# Patient Record
Sex: MALE | Race: WHITE | NOT HISPANIC OR LATINO | Employment: FULL TIME | URBAN - METROPOLITAN AREA
[De-identification: names, ages, dates, MRNs, and addresses within clinical notes are randomized per-mention and may not be internally consistent; named-entity substitution may affect disease eponyms.]

---

## 2017-02-01 ENCOUNTER — APPOINTMENT (OUTPATIENT)
Dept: GENERAL RADIOLOGY | Facility: CLINIC | Age: 57
End: 2017-02-01
Attending: FAMILY MEDICINE
Payer: COMMERCIAL

## 2017-02-01 ENCOUNTER — HOSPITAL ENCOUNTER (INPATIENT)
Facility: CLINIC | Age: 57
LOS: 2 days | Discharge: HOME | DRG: 201 | End: 2017-02-03
Attending: INTERNAL MEDICINE | Admitting: INTERNAL MEDICINE
Payer: COMMERCIAL

## 2017-02-01 ENCOUNTER — HOSPITAL ENCOUNTER (EMERGENCY)
Facility: CLINIC | Age: 57
Discharge: STILL ADMITTED OR ADMINISTRATIVE | End: 2017-02-01
Attending: FAMILY MEDICINE | Admitting: FAMILY MEDICINE
Payer: COMMERCIAL

## 2017-02-01 VITALS
TEMPERATURE: 97.3 F | DIASTOLIC BLOOD PRESSURE: 78 MMHG | OXYGEN SATURATION: 93 % | WEIGHT: 165.6 LBS | RESPIRATION RATE: 24 BRPM | SYSTOLIC BLOOD PRESSURE: 135 MMHG

## 2017-02-01 DIAGNOSIS — J93.11 PRIMARY SPONTANEOUS PNEUMOTHORAX: Primary | ICD-10-CM

## 2017-02-01 DIAGNOSIS — J93.83 SPONTANEOUS PNEUMOTHORAX: ICD-10-CM

## 2017-02-01 DIAGNOSIS — J43.8 OTHER EMPHYSEMA (H): ICD-10-CM

## 2017-02-01 PROBLEM — J93.9 PNEUMOTHORAX: Status: ACTIVE | Noted: 2017-02-01

## 2017-02-01 PROBLEM — J42 CHRONIC BRONCHITIS (H): Status: ACTIVE | Noted: 2017-02-01

## 2017-02-01 LAB
ALBUMIN SERPL-MCNC: 3.7 G/DL (ref 3.4–5)
ALP SERPL-CCNC: 56 U/L (ref 40–150)
ALT SERPL W P-5'-P-CCNC: 22 U/L (ref 0–70)
ANION GAP SERPL CALCULATED.3IONS-SCNC: 7 MMOL/L (ref 3–14)
APTT PPP: 25 SEC (ref 22–37)
AST SERPL W P-5'-P-CCNC: 19 U/L (ref 0–45)
BASOPHILS # BLD AUTO: 0 10E9/L (ref 0–0.2)
BASOPHILS NFR BLD AUTO: 0.3 %
BILIRUB SERPL-MCNC: 1.1 MG/DL (ref 0.2–1.3)
BUN SERPL-MCNC: 21 MG/DL (ref 7–30)
CALCIUM SERPL-MCNC: 9 MG/DL (ref 8.5–10.1)
CHLORIDE SERPL-SCNC: 112 MMOL/L (ref 94–109)
CO2 SERPL-SCNC: 26 MMOL/L (ref 20–32)
CREAT SERPL-MCNC: 0.92 MG/DL (ref 0.66–1.25)
D DIMER PPP FEU-MCNC: 0.5 UG/ML FEU (ref 0–0.5)
DIFFERENTIAL METHOD BLD: ABNORMAL
EOSINOPHIL # BLD AUTO: 0 10E9/L (ref 0–0.7)
EOSINOPHIL NFR BLD AUTO: 0.3 %
ERYTHROCYTE [DISTWIDTH] IN BLOOD BY AUTOMATED COUNT: 13.4 % (ref 10–15)
GFR SERPL CREATININE-BSD FRML MDRD: 85 ML/MIN/1.7M2
GLUCOSE SERPL-MCNC: 133 MG/DL (ref 70–99)
HCT VFR BLD AUTO: 45.9 % (ref 40–53)
HGB BLD-MCNC: 15.2 G/DL (ref 13.3–17.7)
IMM GRANULOCYTES # BLD: 0.1 10E9/L (ref 0–0.4)
IMM GRANULOCYTES NFR BLD: 0.4 %
INR PPP: 0.92 (ref 0.86–1.14)
LYMPHOCYTES # BLD AUTO: 1.1 10E9/L (ref 0.8–5.3)
LYMPHOCYTES NFR BLD AUTO: 9.4 %
MCH RBC QN AUTO: 29.1 PG (ref 26.5–33)
MCHC RBC AUTO-ENTMCNC: 33.1 G/DL (ref 31.5–36.5)
MCV RBC AUTO: 88 FL (ref 78–100)
MONOCYTES # BLD AUTO: 0.8 10E9/L (ref 0–1.3)
MONOCYTES NFR BLD AUTO: 6.8 %
NEUTROPHILS # BLD AUTO: 9.4 10E9/L (ref 1.6–8.3)
NEUTROPHILS NFR BLD AUTO: 82.8 %
NRBC # BLD AUTO: 0 10*3/UL
NRBC BLD AUTO-RTO: 0 /100
PLATELET # BLD AUTO: 159 10E9/L (ref 150–450)
POTASSIUM SERPL-SCNC: 3.8 MMOL/L (ref 3.4–5.3)
PROT SERPL-MCNC: 7.3 G/DL (ref 6.8–8.8)
RADIOLOGIST FLAGS: ABNORMAL
RBC # BLD AUTO: 5.22 10E12/L (ref 4.4–5.9)
SODIUM SERPL-SCNC: 145 MMOL/L (ref 133–144)
TROPONIN I BLD-MCNC: 0.01 UG/L (ref 0–0.1)
TROPONIN I SERPL-MCNC: NORMAL UG/L (ref 0–0.04)
WBC # BLD AUTO: 11.4 10E9/L (ref 4–11)

## 2017-02-01 PROCEDURE — 40000940 XR CHEST PORT 1 VW

## 2017-02-01 PROCEDURE — 25000132 ZZH RX MED GY IP 250 OP 250 PS 637: Performed by: PHYSICIAN ASSISTANT

## 2017-02-01 PROCEDURE — 71010 XR CHEST PORT 1 VW: CPT

## 2017-02-01 PROCEDURE — 84484 ASSAY OF TROPONIN QUANT: CPT

## 2017-02-01 PROCEDURE — 25000128 H RX IP 250 OP 636: Performed by: FAMILY MEDICINE

## 2017-02-01 PROCEDURE — 93005 ELECTROCARDIOGRAM TRACING: CPT | Performed by: FAMILY MEDICINE

## 2017-02-01 PROCEDURE — 12000007 ZZH R&B INTERMEDIATE

## 2017-02-01 PROCEDURE — 99285 EMERGENCY DEPT VISIT HI MDM: CPT | Mod: Z6 | Performed by: FAMILY MEDICINE

## 2017-02-01 PROCEDURE — 99285 EMERGENCY DEPT VISIT HI MDM: CPT | Mod: 25 | Performed by: FAMILY MEDICINE

## 2017-02-01 PROCEDURE — 85730 THROMBOPLASTIN TIME PARTIAL: CPT | Performed by: FAMILY MEDICINE

## 2017-02-01 PROCEDURE — 96376 TX/PRO/DX INJ SAME DRUG ADON: CPT | Performed by: FAMILY MEDICINE

## 2017-02-01 PROCEDURE — 85610 PROTHROMBIN TIME: CPT | Performed by: FAMILY MEDICINE

## 2017-02-01 PROCEDURE — 99222 1ST HOSP IP/OBS MODERATE 55: CPT | Mod: AI | Performed by: PHYSICIAN ASSISTANT

## 2017-02-01 PROCEDURE — 99207 ZZC MOONLIGHTING INDICATOR: CPT | Performed by: PHYSICIAN ASSISTANT

## 2017-02-01 PROCEDURE — 25000125 ZZHC RX 250

## 2017-02-01 PROCEDURE — 84484 ASSAY OF TROPONIN QUANT: CPT | Mod: 91 | Performed by: FAMILY MEDICINE

## 2017-02-01 PROCEDURE — 80053 COMPREHEN METABOLIC PANEL: CPT | Performed by: FAMILY MEDICINE

## 2017-02-01 PROCEDURE — 96375 TX/PRO/DX INJ NEW DRUG ADDON: CPT | Performed by: FAMILY MEDICINE

## 2017-02-01 PROCEDURE — 85025 COMPLETE CBC W/AUTO DIFF WBC: CPT | Performed by: FAMILY MEDICINE

## 2017-02-01 PROCEDURE — 85379 FIBRIN DEGRADATION QUANT: CPT | Performed by: FAMILY MEDICINE

## 2017-02-01 PROCEDURE — 96374 THER/PROPH/DIAG INJ IV PUSH: CPT | Performed by: FAMILY MEDICINE

## 2017-02-01 RX ORDER — HYDROMORPHONE HYDROCHLORIDE 1 MG/ML
0.5 INJECTION, SOLUTION INTRAMUSCULAR; INTRAVENOUS; SUBCUTANEOUS
Status: DISCONTINUED | OUTPATIENT
Start: 2017-02-01 | End: 2017-02-01 | Stop reason: HOSPADM

## 2017-02-01 RX ORDER — OXYBUTYNIN CHLORIDE 5 MG/1
5 TABLET ORAL 3 TIMES DAILY
Status: DISCONTINUED | OUTPATIENT
Start: 2017-02-01 | End: 2017-02-03 | Stop reason: HOSPADM

## 2017-02-01 RX ORDER — ACETAMINOPHEN 325 MG/1
650 TABLET ORAL EVERY 4 HOURS PRN
Status: DISCONTINUED | OUTPATIENT
Start: 2017-02-01 | End: 2017-02-03 | Stop reason: HOSPADM

## 2017-02-01 RX ORDER — PROCHLORPERAZINE 25 MG
25 SUPPOSITORY, RECTAL RECTAL EVERY 12 HOURS PRN
Status: DISCONTINUED | OUTPATIENT
Start: 2017-02-01 | End: 2017-02-03 | Stop reason: HOSPADM

## 2017-02-01 RX ORDER — AMOXICILLIN 250 MG
1-2 CAPSULE ORAL 2 TIMES DAILY
Status: DISCONTINUED | OUTPATIENT
Start: 2017-02-01 | End: 2017-02-03 | Stop reason: HOSPADM

## 2017-02-01 RX ORDER — ONDANSETRON 4 MG/1
4 TABLET, ORALLY DISINTEGRATING ORAL EVERY 6 HOURS PRN
Status: DISCONTINUED | OUTPATIENT
Start: 2017-02-01 | End: 2017-02-03 | Stop reason: HOSPADM

## 2017-02-01 RX ORDER — NALOXONE HYDROCHLORIDE 0.4 MG/ML
.1-.4 INJECTION, SOLUTION INTRAMUSCULAR; INTRAVENOUS; SUBCUTANEOUS
Status: DISCONTINUED | OUTPATIENT
Start: 2017-02-01 | End: 2017-02-03 | Stop reason: HOSPADM

## 2017-02-01 RX ORDER — LIDOCAINE 40 MG/G
CREAM TOPICAL
Status: DISCONTINUED | OUTPATIENT
Start: 2017-02-01 | End: 2017-02-01 | Stop reason: HOSPADM

## 2017-02-01 RX ORDER — METOCLOPRAMIDE HYDROCHLORIDE 5 MG/ML
10 INJECTION INTRAMUSCULAR; INTRAVENOUS EVERY 6 HOURS PRN
Status: DISCONTINUED | OUTPATIENT
Start: 2017-02-01 | End: 2017-02-03 | Stop reason: HOSPADM

## 2017-02-01 RX ORDER — ONDANSETRON 2 MG/ML
4 INJECTION INTRAMUSCULAR; INTRAVENOUS EVERY 6 HOURS PRN
Status: DISCONTINUED | OUTPATIENT
Start: 2017-02-01 | End: 2017-02-03 | Stop reason: HOSPADM

## 2017-02-01 RX ORDER — METOCLOPRAMIDE 10 MG/1
10 TABLET ORAL EVERY 6 HOURS PRN
Status: DISCONTINUED | OUTPATIENT
Start: 2017-02-01 | End: 2017-02-03 | Stop reason: HOSPADM

## 2017-02-01 RX ORDER — HYDROCODONE BITARTRATE AND ACETAMINOPHEN 5; 325 MG/1; MG/1
1-2 TABLET ORAL EVERY 4 HOURS PRN
Status: DISCONTINUED | OUTPATIENT
Start: 2017-02-01 | End: 2017-02-03 | Stop reason: HOSPADM

## 2017-02-01 RX ORDER — BUPIVACAINE HYDROCHLORIDE AND EPINEPHRINE 5; 5 MG/ML; UG/ML
INJECTION, SOLUTION EPIDURAL; INTRACAUDAL; PERINEURAL
Status: COMPLETED
Start: 2017-02-01 | End: 2017-02-01

## 2017-02-01 RX ORDER — MORPHINE SULFATE 2 MG/ML
1 INJECTION, SOLUTION INTRAMUSCULAR; INTRAVENOUS
Status: CANCELLED | OUTPATIENT
Start: 2017-02-01

## 2017-02-01 RX ORDER — ALBUTEROL SULFATE 90 UG/1
2 AEROSOL, METERED RESPIRATORY (INHALATION) EVERY 6 HOURS PRN
COMMUNITY

## 2017-02-01 RX ORDER — HYDROMORPHONE HYDROCHLORIDE 1 MG/ML
.3-.5 INJECTION, SOLUTION INTRAMUSCULAR; INTRAVENOUS; SUBCUTANEOUS
Status: DISCONTINUED | OUTPATIENT
Start: 2017-02-01 | End: 2017-02-03 | Stop reason: HOSPADM

## 2017-02-01 RX ORDER — PROCHLORPERAZINE MALEATE 5 MG
5-10 TABLET ORAL EVERY 6 HOURS PRN
Status: DISCONTINUED | OUTPATIENT
Start: 2017-02-01 | End: 2017-02-03 | Stop reason: HOSPADM

## 2017-02-01 RX ORDER — ONDANSETRON 2 MG/ML
4 INJECTION INTRAMUSCULAR; INTRAVENOUS ONCE
Status: COMPLETED | OUTPATIENT
Start: 2017-02-01 | End: 2017-02-01

## 2017-02-01 RX ADMIN — HYDROMORPHONE HYDROCHLORIDE 0.5 MG: 10 INJECTION, SOLUTION INTRAMUSCULAR; INTRAVENOUS; SUBCUTANEOUS at 12:31

## 2017-02-01 RX ADMIN — ACETAMINOPHEN 650 MG: 325 TABLET, FILM COATED ORAL at 23:03

## 2017-02-01 RX ADMIN — LIDOCAINE HYDROCHLORIDE AND EPINEPHRINE: 20; 10 INJECTION, SOLUTION INFILTRATION; PERINEURAL at 11:44

## 2017-02-01 RX ADMIN — HYDROCODONE BITARTRATE AND ACETAMINOPHEN 2 TABLET: 5; 325 TABLET ORAL at 18:06

## 2017-02-01 RX ADMIN — HYDROMORPHONE HYDROCHLORIDE 0.5 MG: 10 INJECTION, SOLUTION INTRAMUSCULAR; INTRAVENOUS; SUBCUTANEOUS at 11:38

## 2017-02-01 RX ADMIN — BUPIVACAINE HYDROCHLORIDE AND EPINEPHRINE: 5; 5 INJECTION, SOLUTION EPIDURAL; INTRACAUDAL; PERINEURAL at 11:43

## 2017-02-01 RX ADMIN — ONDANSETRON 4 MG: 2 INJECTION INTRAMUSCULAR; INTRAVENOUS at 11:38

## 2017-02-01 RX ADMIN — OXYBUTYNIN CHLORIDE 5 MG: 5 TABLET ORAL at 22:58

## 2017-02-01 ASSESSMENT — ENCOUNTER SYMPTOMS
NAUSEA: 0
SHORTNESS OF BREATH: 1
CHILLS: 0
FEVER: 0
VOMITING: 0
BACK PAIN: 1

## 2017-02-01 NOTE — ED PROVIDER NOTES
History     Chief Complaint   Patient presents with     Chest Pain     chest pain, back pain and SOB since 0345 AM     HPI  Dell Lino is a 56 year old male with a history of COPD who presents with chest pain. The patient reports that he woke up this morning with chest pressure and a sharp pain in his left upper back. He complains of associated shortness of breath as well. He tried to go to work this morning, however, his symptoms did not resolve so he called his PCP and was referred here to the ED for further evaluation and treatment. The patient denies any fever, chills, nausea or vomiting. The chest pressure does not radiate to his arms, neck or jaw. He has not taken anything for his symptoms. He continues to have constant chest pressure and pain in his left upper back, as well as shortness of breath. He denies any history of heart problems, and has had no heart attacks. He is not on any medications aside from Advair for his COPD, which has bene well-controlled. He denies any other chronic medical problems. He has had no abdominal surgeries. He denies any family history of heart attacks or stroke. He is a former smoker.     Past Medical History   Diagnosis Date     COPD (chronic obstructive pulmonary disease) (H)        History reviewed. No pertinent past surgical history.    No family history on file.    Social History   Substance Use Topics     Smoking status: Former Smoker     Smokeless tobacco: Not on file     Alcohol Use: No     Current Facility-Administered Medications   Medication     lidocaine 1 % 1 mL     lidocaine (LMX4) kit     sodium chloride (PF) 0.9% PF flush 3 mL     sodium chloride (PF) 0.9% PF flush 3 mL     HYDROmorphone (PF) (DILAUDID) injection 0.5 mg     Current Outpatient Prescriptions   Medication     OXYBUTYNIN CHLORIDE PO        Allergies   Allergen Reactions     Codeine      Doxycycline       I have reviewed the Medications, Allergies, Past Medical and Surgical History, and Social  History in the Epic system.    Review of Systems   Constitutional: Negative for fever and chills.   Respiratory: Positive for shortness of breath.    Cardiovascular: Positive for chest pain (pressure).   Gastrointestinal: Negative for nausea and vomiting.   Musculoskeletal: Positive for back pain (left upper).   All other systems reviewed and are negative.      Physical Exam   BP: 149/77 mmHg  Heart Rate: 107  Temp: 97.3  F (36.3  C)  Resp: 24  Weight: 75.116 kg (165 lb 9.6 oz)  SpO2: 92 %  Physical Exam   Constitutional: No distress.   HENT:   Head: Atraumatic.   Mouth/Throat: Oropharynx is clear and moist. No oropharyngeal exudate.   Eyes: Pupils are equal, round, and reactive to light. No scleral icterus.   Neck: Neck supple. No JVD present.   Cardiovascular: Regular rhythm, normal heart sounds and intact distal pulses.  Tachycardia present.    No murmur heard.  Pulmonary/Chest: He is in respiratory distress (mild tachypnea, O2 sat 91% on room air). He has no wheezes (diminished breath sounds throughout on the right).   Abdominal: Soft. Bowel sounds are normal. There is no tenderness.   Musculoskeletal: He exhibits no edema or tenderness.   Skin: Skin is warm. No rash noted. He is not diaphoretic.       ED Course     Chest tube insertion  Date/Time: 2/1/2017 11:00 AM  Performed by: ELINOR GARCIA  Authorized by: ELINOR GARCIA  Consent: Verbal consent obtained. Written consent obtained.  Risks and benefits: risks, benefits and alternatives were discussed  Consent given by: patient  Patient understanding: patient states understanding of the procedure being performed  Patient consent: the patient's understanding of the procedure matches consent given  Procedure consent: procedure consent matches procedure scheduled  Relevant documents: relevant documents present and verified  Test results: test results available and properly labeled  Site marked: the operative site was marked  Imaging studies: imaging studies  "available  Required items: required blood products, implants, devices, and special equipment available  Patient identity confirmed: verbally with patient and arm band  Time out: Immediately prior to procedure a \"time out\" was called to verify the correct patient, procedure, equipment, support staff and site/side marked as required.  Indications: pneumothorax  Patient sedated: no  Anesthesia: local infiltration  Local anesthetic: lidocaine 1% with epinephrine  Anesthetic total: 10 ml  Preparation: skin prepped with ChloraPrep, skin prepped with Betadine and sterile field established  Placement location: right lateral  Scalpel size: 11  Tube size: 8 American  Tension pneumothorax heard: no  Tube connected to: suction  Drainage characteristics: clear  Drainage amount: 5 ml  Suture material: 2-0 silk  Dressing: 4x4 sterile gauze  Post-insertion x-ray findings: tube in good position  Patient tolerance: Patient tolerated the procedure well with no immediate complications                 EKG Interpretation:      Interpreted by Juan Workman  Time reviewed: 0950  Symptoms at time of EKG: Right-sided chest pain   Rhythm: sinus tachycardia  Rate: Tachycardia  Axis: Normal  Ectopy: none  Conduction: normal  ST Segments/ T Waves: No ST-T wave changes and No acute ischemic changes  Q Waves: none  Comparison to prior: No old EKG available    Clinical Impression: Sinus tachycardia, otherwise unremarkable EKG                Critical Care time:  none               Labs Ordered and Resulted from Time of ED Arrival Up to the Time of Departure from the ED   CBC WITH PLATELETS DIFFERENTIAL - Abnormal; Notable for the following:     WBC 11.4 (*)     Absolute Neutrophil 9.4 (*)     All other components within normal limits   COMPREHENSIVE METABOLIC PANEL - Abnormal; Notable for the following:     Sodium 145 (*)     Chloride 112 (*)     Glucose 133 (*)     All other components within normal limits   TROPONIN I   INR   PARTIAL THROMBOPLASTIN " TIME   D DIMER QUANTITATIVE   PULSE OXIMETRY NURSING   CARDIAC CONTINUOUS MONITORING   PERIPHERAL IV CATHETER   TROPONIN POCT       Assessments & Plan (with Medical Decision Making)   Patient presenting with a sudden onset of right-sided chest pain, tachycardia, hypoxia.  He does have a history of COPD.   Initial differential diagnosis included a life-threatening cause of chest pain such as a ACS, PE, dissection, pneumonia, pneumothorax, pericarditis ,Boerhaave tear, and other life-threatening as well as nonlife threatening causes of acute chest pain.  On exam he had diminished breath sounds on the right and stat portable chest x-ray confirmed almost complete collapse of the right lung without signs of tension pneumothorax.  Patient consented to placement of a chest tube, the tube was placed and connected to wall suction.  The patient's vital signs and clinical symptoms improved dramatically almost immediately after the placement of the chest tube and have remained stable.  Postprocedure chest x-ray confirmed almost complete reinflation of the lung.  His other labs, EKG are unremarkable.  Plan is to admit to the hospitalist service.  Unfortunately no beds are available at [facility].  I spoke with the hospitalist at [facility] who agreed to admit and the patient agrees to transport.  He is stable for transport to [facility].    I have reviewed the nursing notes.    I have reviewed the findings, diagnosis, plan and need for follow up with the patient.    New Prescriptions    No medications on file       Final diagnoses:   Spontaneous pneumothorax   Other emphysema (H)       2/1/2017   [facility], EMERGENCY DEPARTMENT      Juan Workman MD  02/01/17 3793

## 2017-02-01 NOTE — ED NOTES
Pt transferred to room 1 and returned to room 18 after chest tube placement. Patient tolerated procedure well. Pain medication given x 2. Waiting for bed placement on the unit.

## 2017-02-01 NOTE — LETTER
February 3, 2017    RE:  Dell Lino                                                                                                                                                       [address] [facility] [address]          To whom it may concern:    Dell Lino was hospitalized from 2/1/2017 to 2/3/2017 and was under my professional care on date of discharge. He is cleared to return to work without restriction on 2/8/17.      Sincerely,        Lewis Dey MD   Hospitalist

## 2017-02-01 NOTE — IP AVS SNAPSHOT
MRN:[id number]                      After Visit Summary   2/1/2017    Dell Lino    MRN: [id number]           Thank you!     Thank you for choosing [facility] for your care. Our goal is always to provide you with excellent care. Hearing back from our patients is one way we can continue to improve our services. Please take a few minutes to complete the written survey that you may receive in the mail after you visit with us. Thank you!        Patient Information     Date Of Birth          1960        About your hospital stay     You were admitted on:  February 1, 2017 You last received care in the:  [place] [facility] [address] [facility]    You were discharged on:  February 3, 2017        Reason for your hospital stay       You were hospitalized for a pneumothorax (air around your lungs)                  Who to Call     For medical emergencies, please call 911.  For non-urgent questions about your medical care, please call your primary care provider or clinic, [phone]          Attending Provider     Provider    Sirena Hanley MD       Primary Care Provider Office Phone # Fax #    Jt Devine [phone]       [facility] [address] [place] [address]         When to contact your care team       Return to the Emergency Department immediately if you feel sudden onset of chest pain and/or shortness of breath.                  After Care Instructions     Activity       Your activity upon discharge: activity as tolerated            Diet       Follow this diet upon discharge: Regular diet                  Follow-up Appointments     Follow-up and recommended labs and tests        Follow-up with thoracic surgery (Dr. Mao) on 2/13/17. Follow-up with primary care provider, Jt Devine, as needed.                  Further instructions from your care team       [facility]  Discharge Orders & Follow-up Care  Video-Assisted:  "Thoracoscopy - Thoracotomy    Call Laura at Dr. Mao  office at [phone] to make a return appointment with a chest x-ray on  February 13  Your appointment will be with either Micki Serna PA-C or Dr. Mao, or both.      Our office is located at:  [facility], [address] [place] [address], [address], [place], [place] [address].  Laura will explain where to park when you call for an appointment.    A. Patient Care  Call Dr Mao  office @ [phone] if:  ? Severe chills or a fever or 100.4 F.  temperature on two occasions  ? Increased incisional pain and/or redness  ? Presence of unusual incisional or chest tube site drainage  ? Coughing up bright red blood or greenish-yellow secretions  ? Significant increase in shortness of breath    Pain Relief  You have been given a prescription for narcotic pain medicine.  You may also take ibuprofen and acetaminophen over the counter.  Recommended dosages are:  600 mg Ibuprofen every 6 hours as needed and 650 mg Acetaminophen every 4 hours as needed.  Many patients get good pain relief by \"staggering\" these medications.     No driving while on narcotics.     Activity  XXX  No activity restrictions for a scope procedure/thoracoscopy  ___ No heavy lifting greater than 8-10 pounds on the operative side for 4-6 weeks for a thoracotomy    Wound Care  Remove all dressings in 48 hours and then you may shower.      Pneumothorax (Collapsed Lung)    A pneumothorax occurs when air fills the pleural cavity (the space between your lung and chest wall). This can cause all or part of your lung to collapse. The main cause of a pneumothorax is an injury to the chest cavity that punctures the lungs. Damage may result from a stab or gunshot wound, car accident, fall, or certain surgical procedures. In some cases, a pneumothorax happens spontaneously, without an obvious cause.   You're more likely to have spontaneous pneumothorax if you smoke or have a chronic lung disease, such as " emphysema.    When to Go to the Emergency Room (ER)  Serious pneumothorax can be fatal if not treated. Call 911 for a bad chest wound or any of the following symptoms:    Sudden, sharp chest pain that may spread to your shoulder or back    Shortness of breath; trouble breathing    A bluish color to the skin    Loss of consciousness or feeling faint with any of the above symptoms  What to Expect in the ER    You will be examined carefully.    Your lungs and heart will be listened to through a stethoscope.    You may have X-rays or a computed tomography (CT) scan. A CT scan combines X-rays and computer scans to provide detailed pictures of your lungs.    You will be given help with breathing if you need it.  Treatment    If the pneumothorax is small, you may stay in the ER for 5 to 6 hours to see if it gets any worse. If it does not get worse, you may be sent home without treatment and told to follow up with your regular doctor.    If the pneumothorax needs treatment, you will be admitted to the hospital. The air in your pleural cavity may be removed with a needle. Or, a hollow chest tube may be placed in your chest. This is attached to a suction device that removes the air. In that case, you will be admitted to the hospital for a few days.  After treatment, you will be told what to do to care for yourself and when to follow up with your doctor.    3062-0309 [employer]. [address] [place] [address]. All rights reserved. This information is not intended as a substitute for professional medical care. Always follow your healthcare professional's instructions.      Pending Results     No orders found from 1/31/2017 to 2/2/2017.            Statement of Approval     Ordered          02/03/17 1210  I have reviewed and agree with all the recommendations and orders detailed in this document.   EFFECTIVE NOW     Approved and electronically signed by:  Lewis Dey MD             Admission  "Information        Provider Department Dept Phone    2017 Sirena Hanley MD  [facility] [phone]      Your Vitals Were     Blood Pressure Pulse Temperature    125/85 mmHg 78 97.6  F (36.4  C) (Oral)    Respirations Height Weight    16 1.777 m (5' 9.96\") 70.4 kg (155 lb 3.3 oz)    BMI (Body Mass Index) Pulse Oximetry       22.29 kg/m2 96%       MyChart Information     [facility] lets you send messages to your doctor, view your test results, renew your prescriptions, schedule appointments and more. To sign up, go to [email or web address] . Click on \"Log in\" on the left side of the screen, which will take you to the Welcome page. Then click on \"Sign up Now\" on the right side of the page.     You will be asked to enter the access code listed below, as well as some personal information. Please follow the directions to create your username and password.     Your access code is: [email or web address]  Expires: 2017 12:58 PM     Your access code will  in 90 days. If you need help or a new code, please call your [facility] clinic or [phone].        Care EveryWhere ID     This is your Care EveryWhere ID. This could be used by other organizations to access your [facility] medical records  [id number]           Review of your medicines      START taking        Dose / Directions    HYDROcodone-acetaminophen 5-325 MG per tablet   Commonly known as:  NORCO   Used for:  Primary spontaneous pneumothorax        Dose:  1-2 tablet   Take 1-2 tablets by mouth every 4 hours as needed for moderate to severe pain   Quantity:  10 tablet   Refills:  0         CONTINUE these medicines which have NOT CHANGED        Dose / Directions    albuterol 108 (90 BASE) MCG/ACT Inhaler   Commonly known as:  PROAIR HFA/PROVENTIL HFA/VENTOLIN HFA        Dose:  2 puff   Inhale 2 puffs into the lungs every 6 hours as needed for shortness of breath / dyspnea or wheezing   Refills:  0       COLLAGEN PO   Notes to Patient:  Continue " home schedule        Dose:  6 capsule   Take 6 capsules by mouth daily Unknown capsule strength   Refills:  0       fluticasone-salmeterol 100-50 MCG/DOSE diskus inhaler   Commonly known as:  ADVAIR   Notes to Patient:  Continue home schedule        Dose:  1 puff   Inhale 1 puff into the lungs every 12 hours   Refills:  0       HM MULTIVITAMIN ADULT GUMMY PO        Dose:  1 chew tab   Take 1 chew tab by mouth daily   Refills:  0       OXYBUTYNIN CHLORIDE PO        Dose:  5 mg   Take 5 mg by mouth 3 times daily   Refills:  0            Where to get your medicines      Some of these will need a paper prescription and others can be bought over the counter. Ask your nurse if you have questions.     Bring a paper prescription for each of these medications    - HYDROcodone-acetaminophen 5-325 MG per tablet             Protect others around you: Learn how to safely use, store and throw away your medicines at www.disposemymeds.org.             Medication List: This is a list of all your medications and when to take them. Check marks below indicate your daily home schedule. Keep this list as a reference.      Medications           Morning Afternoon Evening Bedtime As Needed    albuterol 108 (90 BASE) MCG/ACT Inhaler   Commonly known as:  PROAIR HFA/PROVENTIL HFA/VENTOLIN HFA   Inhale 2 puffs into the lungs every 6 hours as needed for shortness of breath / dyspnea or wheezing                                   COLLAGEN PO   Take 6 capsules by mouth daily Unknown capsule strength   Notes to Patient:  Continue home schedule                                fluticasone-salmeterol 100-50 MCG/DOSE diskus inhaler   Commonly known as:  ADVAIR   Inhale 1 puff into the lungs every 12 hours   Notes to Patient:  Continue home schedule                                      HM MULTIVITAMIN ADULT GUMMY PO   Take 1 chew tab by mouth daily                                   HYDROcodone-acetaminophen 5-325 MG per tablet   Commonly known as:  NORCO    Take 1-2 tablets by mouth every 4 hours as needed for moderate to severe pain   Last time this was given:  2 tablets on 2/2/2017  6:50 PM                                   OXYBUTYNIN CHLORIDE PO   Take 5 mg by mouth 3 times daily   Last time this was given:  5 mg on 2/3/2017  8:36 AM

## 2017-02-01 NOTE — PHARMACY-ADMISSION MEDICATION HISTORY
Admission Medication History status for the 2/1/2017 admission is complete.  See EPIC admission navigator for Prior to Admission medications.    Medication history sources:  Patient, [facility] records (CareEverywhere)    Medication history source reliability: Good; patient knew his medication names; confirmed doses with [facility] records    Medication adherence:  Good per patient    Changes made to PTA medication list (reason)  Added:   - Albuterol HFA 2 puffs q6h PRN per patient and [facility] records  - Multivitamin gummy 1 gummy po daily per patient  - Collagen 6 capsules po daily per patient. Patient doesn't know capsule strength.  Deleted:   - Excedrin; patient doesn't normally take  Changed:   - Fluticasone- Salmeterol 100-50 mcg/dose 1 puff q12h per [facility] records (updated dose)  - Oxybutynin 5 mg po TID per [facility] records (updated dose)    Additional medication history information (including reliability of information, actions taken by pharmacist): None    Time spent in this activity: 15 minutes    Medication history completed by: Olamide Roland, PharmD    Prior to Admission medications    Medication Sig Last Dose Taking? Auth Provider   OXYBUTYNIN CHLORIDE PO Take 5 mg by mouth 3 times daily  2/1/2017 at 0900 Yes Reported, Patient   fluticasone-salmeterol (ADVAIR) 100-50 MCG/DOSE diskus inhaler Inhale 1 puff into the lungs every 12 hours 2/1/2017 at 0300 Yes Unknown, Entered By History   Multiple Vitamins-Minerals (HM MULTIVITAMIN ADULT GUMMY PO) Take 1 chew tab by mouth daily 2/1/2017 Yes Unknown, Entered By History   COLLAGEN PO Take 6 capsules by mouth daily Unknown capsule strength 1/31/2017 Yes Unknown, Entered By History   albuterol (PROAIR HFA/PROVENTIL HFA/VENTOLIN HFA) 108 (90 BASE) MCG/ACT Inhaler Inhale 2 puffs into the lungs every 6 hours as needed for shortness of breath / dyspnea or wheezing  More than a month  Unknown, Entered By History

## 2017-02-01 NOTE — IP AVS SNAPSHOT
[facility] [address] [facility]    [address] [place] [address]    Phone:  [phone]                                       After Visit Summary   2/1/2017    Dell Lino    MRN: [id number]           After Visit Summary Signature Page     I have received my discharge instructions, and my questions have been answered. I have discussed any challenges I see with this plan with the nurse or doctor.    ..........................................................................................................................................  Patient/Patient Representative Signature      ..........................................................................................................................................  Patient Representative Print Name and Relationship to Patient    ..................................................               ................................................  Date                                            Time    ..........................................................................................................................................  Reviewed by Signature/Title    ...................................................              ..............................................  Date                                                            Time

## 2017-02-01 NOTE — PROGRESS NOTES
2017    Re: Dell Lino   : 1960    To Whom It May Concern,    Mr. Lino had a medical condition which precludes him from travel to the [facility] on February 3, 2017.    Sincerely,      Naz Gibson PA-C  [facility]

## 2017-02-01 NOTE — H&P
[facility]    History and Physical  Hospitalist       Date of Admission:  2/1/2017  Date of Service (when I saw the patient): 02/01/2017    Assessment and Plan  Dell Lino is a 56 year old male with a past medical history of COPD and overactive bladder who presented to the [place] ER for evaluation of chest pain and shortness of breath. Was found to have a right sided pneumothorax and a chest tube was placed. Transferred to [facility] for thoracic surgery consultation    Summary:    -Pneumothorax, initial episode, chest tube in place with no current air leak, hemodynamically stable   -Thoracic surgery consult   -continue chest tube to wall suction   -supplemental oxygen as needed   -Will order tylenol for mild pain, Norco for moderate pain and as needed dilaudid for severe pain    -has hx of codeine allergy but was due to inability to sleep, tolerated dilaudid in ER earlier today without issue.    -COPD, chronic, not in an acute exacerbation   -Continue Advair 100/50    -Overactive bladder   -continue oxybutynin      DVT Prophylaxis: Pneumatic Compression Devices  Code Status: Full Code    Disposition: Expected discharge in 1-2 days once pneumothorax resolves.  Plans to travel to [facility] on Friday but told him that I did not advise this. Letter written for airlines and given to patient.    Naz Gibson PA-C  Pager [phone]     This patient was discussed with Dr. Hanley of the Hospitalist Service who agrees with current plans as outlined above.     Primary Care Physician  Jt Devine    Chief Complaint  Pneumothorax    History is obtained from the patient, chart review and discussion with the nurse    History of Present Illness  Dell Lino is a 56 year old male with a past medical history of COPD and overactive bladder who presented to the [place] ER for evaluation of chest pain and shortness of breath. He awoke at his typical time (03:45) this morning and was in his usual  state of health. After about 45 minutes he noticed progressive chest and upper back pain. Shortness of breath began shortly thereafter. He denies any recent illness, coughing spells or trauma. He then went to work for four hours and had progressive symptoms. He was directed to the ER by his PCP clinic for ongoing evaluation.    Chest x-ray in the ER revealed a large right pneumothorax with near complete collapse of the right lung.  A chest tube was placed and the repeat chest x-ray revealed an expanded lung. Per the RN he initially had an air leak but this has since resolved. He is currently stable on room air. He states that he has mild chest pain at the chest tube insertion site but denies any other symptoms. His breathing is back to his baseline. EKG did not reveal any ischemic changes or arrhythmias. Troponin was negative and d-dimer ws 0.5 (upper limit of normal).    Past Medical History   I have reviewed this patient's medical history and updated it with pertinent information if needed.   Past Medical History   Diagnosis Date     COPD (chronic obstructive pulmonary disease) (H)      Overactive bladder        Past Surgical History  I have reviewed this patient's surgical history and updated it with pertinent information if needed.  No past surgical history on file.    Prior to Admission Medications  Prior to Admission Medications   Prescriptions Last Dose Informant Patient Reported? Taking?   COLLAGEN PO  Self Yes No   Sig: Take 6 capsules by mouth daily Unknown capsule strength   Multiple Vitamins-Minerals (HM MULTIVITAMIN ADULT GUMMY PO)  Self Yes No   Sig: Take 1 chew tab by mouth daily   OXYBUTYNIN CHLORIDE PO  Self Yes No   Sig: Take 5 mg by mouth 3 times daily    albuterol (PROAIR HFA/PROVENTIL HFA/VENTOLIN HFA) 108 (90 BASE) MCG/ACT Inhaler  Self Yes No   Sig: Inhale 2 puffs into the lungs every 6 hours as needed for shortness of breath / dyspnea or wheezing    fluticasone-salmeterol (ADVAIR) 100-50  MCG/DOSE diskus inhaler  Self Yes No   Sig: Inhale 1 puff into the lungs every 12 hours      Facility-Administered Medications: None     Allergies  Allergies   Allergen Reactions     Codeine      Doxycycline        Social History  I have reviewed this patient's social history and updated it with pertinent information if needed. Dell Lino  reports that he has quit smoking. His smoking use included Cigarettes. He does not have any smokeless tobacco history on file. He reports that he does not drink alcohol or use illicit drugs.   Quit smoking a few years ago, smoked 1 ppd on and off for 35-40 years.  Quick drinking alcohol in 1994.    Family History  Family history reviewed with patient and is noncontributory.    Review of Systems  The 10 point Review of Systems is negative other than noted in the HPI or here.     Physical Exam  Temp: 99.1  F (37.3  C) Temp src: Oral BP: 137/83 mmHg Pulse: 64   Resp: 16 SpO2: 96 % O2 Device: None (Room air)    Vital Signs with Ranges  Temp:  [97.3  F (36.3  C)-99.1  F (37.3  C)] 99.1  F (37.3  C)  Pulse:  [64] 64  Heart Rate:  [] 74  Resp:  [14-24] 16  BP: (124-156)/() 137/83 mmHg  SpO2:  [91 %-99 %] 96 %  0 lbs 0 oz    Constitutional: Alert and oriented x3, no acute distress  Eyes: PERRL, EOMs intact  HEENT: patent nares, supple neck  Respiratory: clear to auscultation, decreased breath sounds bilaterally, decreased inspiratory effort due to pain, chest tube in place, bandage c/d/i, no air leak noted  Cardiovascular: regular rate and rhythm, no murmurs  GI: soft, non tender, non distended, bowel sounds present, no peritoneal signs  Lymph/Hematologic: no evidence of jaundice or bruising  Genitourinary: deferred  Skin: warm and dry, no rashes  Musculoskeletal: able to move all extremities  Neurologic: no focal neurologic deficits, gait not examined  Psychiatric: affect normal, answers questions appropriately    Data  Data reviewed today:  I personally reviewed the EKG  tracing showing no ischemic changes.  Chest x-ray with pneumothorax on the right which resolved after CT placement..    Recent Labs  Lab 02/01/17  1004 02/01/17  1003   WBC 11.4*  --    HGB 15.2  --    MCV 88  --      --    INR 0.92  --    *  --    POTASSIUM 3.8  --    CHLORIDE 112*  --    CO2 26  --    BUN 21  --    CR 0.92  --    ANIONGAP 7  --    SARAH 9.0  --    *  --    ALBUMIN 3.7  --    PROTTOTAL 7.3  --    BILITOTAL 1.1  --    ALKPHOS 56  --    ALT 22  --    AST 19  --    TROPI <0.015The 99th percentile for upper reference range is 0.045 ug/L.  Troponin values in the range of 0.045 - 0.120 ug/L may be associated with risks of adverse clinical events.  --    TROPONIN  --  0.01       Imaging:  Recent Results (from the past 24 hour(s))   XR Chest Port 1 View   Result Value    Radiologist flags Large right pneumothorax. (AA)    Narrative    XR CHEST PORT 1 VW 2/1/2017 10:35 AM    COMPARISON: None.    HISTORY: Chest pain      Impression    IMPRESSION: Large right pneumothorax with near complete collapse of  the right lung. There is no appreciable mediastinal shift at this  time. No pneumothorax on the left.    [Critical Result: Large right pneumothorax.]    Finding was identified on 2/1/2017 11:09 AM.     Emergency department [facility] was contacted by me on 2/1/2017 11:12 AM and  verbalized understanding of the critical result. She stated that Dr. Workman was currently placing a chest tube on the patient, and aware  of the pneumothorax finding.    ADRIANE BLANTON   Chest  XR, 1 view portable    Narrative    CHEST PORTABLE ONE VIEW February 1, 2017 11:46 AM     HISTORY: Post chest tube.    COMPARISON: Frontal chest x-ray 2/1/2017.      Impression    IMPRESSION: There has been interval placement of a small-caliber right  chest tube with a marked interval reduction in size of the right  pneumothorax. There may be a small residual pneumothorax in the right  costophrenic sulcus. There is airspace opacity of  the medial right  lung base that may represent atelectasis or pneumonia. The right lung  is otherwise clear. The left lung is clear. There is no pleural  effusion on the left. There is no pneumothorax on the left. Heart size  remains within normal limits with no evidence for congestive failure.    MAYRA VELAZQUEZ MD

## 2017-02-02 ENCOUNTER — APPOINTMENT (OUTPATIENT)
Dept: GENERAL RADIOLOGY | Facility: CLINIC | Age: 57
DRG: 201 | End: 2017-02-02
Attending: THORACIC SURGERY (CARDIOTHORACIC VASCULAR SURGERY)
Payer: COMMERCIAL

## 2017-02-02 ENCOUNTER — APPOINTMENT (OUTPATIENT)
Dept: CT IMAGING | Facility: CLINIC | Age: 57
DRG: 201 | End: 2017-02-02
Attending: THORACIC SURGERY (CARDIOTHORACIC VASCULAR SURGERY)
Payer: COMMERCIAL

## 2017-02-02 LAB — INTERPRETATION ECG - MUSE: NORMAL

## 2017-02-02 PROCEDURE — 12000007 ZZH R&B INTERMEDIATE

## 2017-02-02 PROCEDURE — 71250 CT THORAX DX C-: CPT

## 2017-02-02 PROCEDURE — 99231 SBSQ HOSP IP/OBS SF/LOW 25: CPT | Performed by: INTERNAL MEDICINE

## 2017-02-02 PROCEDURE — 25000128 H RX IP 250 OP 636: Performed by: PHYSICIAN ASSISTANT

## 2017-02-02 PROCEDURE — 25000132 ZZH RX MED GY IP 250 OP 250 PS 637: Performed by: PHYSICIAN ASSISTANT

## 2017-02-02 PROCEDURE — 71010 XR CHEST PORT 1 VW: CPT

## 2017-02-02 PROCEDURE — 25000132 ZZH RX MED GY IP 250 OP 250 PS 637: Performed by: INTERNAL MEDICINE

## 2017-02-02 RX ADMIN — SENNOSIDES AND DOCUSATE SODIUM 1 TABLET: 8.6; 5 TABLET ORAL at 08:55

## 2017-02-02 RX ADMIN — OXYBUTYNIN CHLORIDE 5 MG: 5 TABLET ORAL at 08:55

## 2017-02-02 RX ADMIN — HYDROMORPHONE HYDROCHLORIDE 0.5 MG: 1 INJECTION, SOLUTION INTRAMUSCULAR; INTRAVENOUS; SUBCUTANEOUS at 00:30

## 2017-02-02 RX ADMIN — FLUTICASONE FUROATE AND VILANTEROL TRIFENATATE 1 PUFF: 100; 25 POWDER RESPIRATORY (INHALATION) at 21:10

## 2017-02-02 RX ADMIN — OXYBUTYNIN CHLORIDE 5 MG: 5 TABLET ORAL at 21:10

## 2017-02-02 RX ADMIN — HYDROCODONE BITARTRATE AND ACETAMINOPHEN 2 TABLET: 5; 325 TABLET ORAL at 18:50

## 2017-02-02 RX ADMIN — OXYBUTYNIN CHLORIDE 5 MG: 5 TABLET ORAL at 16:12

## 2017-02-02 RX ADMIN — SENNOSIDES AND DOCUSATE SODIUM 1 TABLET: 8.6; 5 TABLET ORAL at 21:10

## 2017-02-02 NOTE — PROGRESS NOTES
[facility]    Hospitalist Progress Note    Date of Service (when I saw the patient): 02/02/2017    Assessment and Plan  Dell Lino is a 56 year-old male with a past medical history of COPD and overactive bladder who presented to the [place] ER for evaluation of chest pain and shortness of breath. Was found to have a right sided pneumothorax and a chest tube was placed. Transferred to [facility] 2/1/2017 for thoracic surgery consultation    Spontaneous pneumothorax  Initial episode. Occuring in setting of underlying COPD with emphysema, and biapical fibrosis and bullous change noted on CT. Chest tube placed at ED prior to transfer.   - Hemodynamically stable and on room air  - Thoracic surgery consulted, appreciate assistance. Chest tube management per thoracic surgery   - Having some pain, but not requesting any medications for it; encouraged him that narcotics are available if needed and pain control is important to encourage deep inspiration    COPD  Chronic, not in an acute exacerbation. Emphysema, and biapical fibrosis and bullous change. Continue Advair 100/50    Overactive bladder  Continue oxybutynin    DVT Prophylaxis: Pneumatic Compression Devices  Code Status: Full Code    Disposition: Possible discharge 2/3/17 if chest tube removed and cleared per thoracic surgery    Lewis Dey    Interval History  Denies shortness of breath. Having some intermittent pain at chest tube site, although has not requested pain medication for it. No other new symptoms.     -Data reviewed today: I reviewed all new labs and imaging results over the last 24 hours. I personally reviewed no images or EKG's today.    Physical Exam  Temp: 98.7  F (37.1  C) Temp src: Oral BP: 128/86 mmHg Pulse: 67   Resp: 16 SpO2: 95 % O2 Device: None (Room air)    Filed Vitals:    02/01/17 1657   Weight: 75.116 kg (165 lb 9.6 oz)     Vital Signs with Ranges  Temp:  [97.7  F (36.5  C)-99.3  F (37.4  C)] 98.7  F (37.1   C)  Pulse:  [62-77] 67  Resp:  [16] 16  BP: (123-140)/(72-96) 128/86 mmHg  SpO2:  [92 %-97 %] 95 %  I/O last 3 completed shifts:  In: 460 [P.O.:460]  Out: 5 [Chest Tube:5]    Constitutional: Well-appearing, NAD  Respiratory: Clear to auscultation bilaterally, normal effort  Cardiovascular: RRR, no m/r/g. No peripheral edema.  GI: Soft, non-tender, non-distended. BS normoactive.   Skin/Integumen: Warm, dry  Other:     Medications       senna-docusate  1-2 tablet Oral BID     oxybutynin (DITROPAN) tablet 5 mg  5 mg Oral TID     fluticasone-vilanterol  1 puff Inhalation QPM       Data    Recent Labs  Lab 02/01/17  1004 02/01/17  1003   WBC 11.4*  --    HGB 15.2  --    MCV 88  --      --    INR 0.92  --    *  --    POTASSIUM 3.8  --    CHLORIDE 112*  --    CO2 26  --    BUN 21  --    CR 0.92  --    ANIONGAP 7  --    SARAH 9.0  --    *  --    ALBUMIN 3.7  --    PROTTOTAL 7.3  --    BILITOTAL 1.1  --    ALKPHOS 56  --    ALT 22  --    AST 19  --    TROPI <0.015The 99th percentile for upper reference range is 0.045 ug/L.  Troponin values in the range of 0.045 - 0.120 ug/L may be associated with risks of adverse clinical events.  --    TROPONIN  --  0.01       Recent Results (from the past 24 hour(s))   CT Chest w/o Contrast    Narrative    CT CHEST WITHOUT CONTRAST 2/2/2017 12:15 AM     HISTORY: Pneumothorax.    COMPARISON: None.    TECHNIQUE: Volumetric helical acquisition of CT images of the chest  from the clavicles to the kidneys were acquired without IV contrast.  Radiation dose for this scan was reduced using automated exposure  control, adjustment of the mA and/or kV according to patient size, or  iterative reconstruction technique.    FINDINGS:  Small bore pigtail catheter noted anteriorly. Small  residual pneumothorax at the base between the fissures and anteriorly.  Some compressive atelectasis versus less likely infiltrate is seen  posteriorly on the right. Apical fibrosis and bullous change  are seen  bilaterally. Mild-moderate areas of emphysema noted in the lungs.  Visualized upper abdomen unremarkable.      Impression    IMPRESSION: Emphysema, and biapical fibrosis and bullous change, small  residual pneumothorax on the right with chest tube in place.    WERO GALLARDO MD   XR Chest Port 1 View    Narrative    XR CHEST PORT 1 VW  2/2/2017 5:10 AM     HISTORY:  chest tube    COMPARISON: Film dated to/1/2017    FINDINGS:  Smallbore right chest tube is noted. The position of the  tube has changed since the previous film. The tip of the catheter is  now at the level of the right sixth rib. A small right pneumothorax  still appears to be present. It has decreased since the prior film.      Impression    IMPRESSION:  1. Right chest tube has been repositioned.  2. Decrease in the size of the right pneumothorax.    LISBETH TRENT MD

## 2017-02-02 NOTE — PLAN OF CARE
Problem: Goal Outcome Summary  Goal: Goal Outcome Summary  Outcome: No Change  VSS, 92% on room air. Chest tube x 1, minimal serosanguinous output.No air leak or crepitus. Foam dressing CDI. Pain managed with dilauded. NPO since midnight, awaiting consult. Assist x1.

## 2017-02-02 NOTE — CONSULTS
History and Physical   Thoracic Surgery- [facility]   [facility]  Micki Serna PA-C    Dell Lino MRN# [id number]   YOB: 1960 Age: 56 year old      Date of Admission:  2/1/2017  Consulting Physician: Micki Serna PA-C on behalf of Jeramie Mao MD           Assessment and Plan:   1) Continue chest tube suction today- clamp chest tube at midnight and PCXR at 0600 tomorrow.  If right lung stays expanded then anticipate removing CT and DCing home tomorrow.  2) Pain control, ok to ambulate on water seal, regular diet, IS                Primary Care Physician:   Jt Devine [phone]         Chief Complaint:   CC: right chest pain and dyspnea    History is obtained from the patient and review of the medical records         History of Present Illness:   Dell Lino is a 56 year old male who presents with first episode of spontaneous right pneumothorax.  Yesterday morning he awoke around 3:45 am (his usual wake-up time) with right anterior chest pain and right scapular pain.  He went to work and the pain worsened and he became dyspneic, which prompted him to call his PCP office.  The triage RN there urged him to present to ER so he went to [facility] and they diagnosed a large right PTX on CXR.  A small-bore pigtail cathter/CT was placed and the lung re-expanded.  He was transferred to [facility] for thoracic surgery evaluation and recommendation.  Dell denies trauma, previous pneumothoraces, recent URI/cough/fever/sore throat.  He had pneumonia once years ago.  He has COPD under good control with a daily inhaler.  No longer smokes. Denies abdominal pain, constipation, bloody stools, hematuria, dizziness, severe headaches, skin changes, weight loss, and heart palpitations. Currently not dyspneic.  Pain control good.           Past Medical History:   Past medical history was reviewed and updated with the patient.  Past Medical History   Diagnosis Date     COPD (chronic obstructive  pulmonary disease) (H)      Overactive bladder             Past Surgical History:   Past surgical history was reviewed and updated with the patient.  Cardiac ablation which resolved his aberrant rhythm- no sequelae         Home Medications:     Prior to Admission Medications   Prescriptions Last Dose Informant Patient Reported? Taking?   COLLAGEN PO 1/31/2017 at Unknown time Self Yes Yes   Sig: Take 6 capsules by mouth daily Unknown capsule strength   Multiple Vitamins-Minerals (HM MULTIVITAMIN ADULT GUMMY PO) 2/1/2017 at am Self Yes Yes   Sig: Take 1 chew tab by mouth daily   OXYBUTYNIN CHLORIDE PO 2/1/2017 at am Self Yes Yes   Sig: Take 5 mg by mouth 3 times daily    albuterol (PROAIR HFA/PROVENTIL HFA/VENTOLIN HFA) 108 (90 BASE) MCG/ACT Inhaler More than a month at Unknown time Self Yes No   Sig: Inhale 2 puffs into the lungs every 6 hours as needed for shortness of breath / dyspnea or wheezing    fluticasone-salmeterol (ADVAIR) 100-50 MCG/DOSE diskus inhaler 2/1/2017 at 0300 Self Yes Yes   Sig: Inhale 1 puff into the lungs every 12 hours      Facility-Administered Medications: None            Allergies:   Allergies were reviewed and updated with the patient.  Allergies   Allergen Reactions     Codeine Other (See Comments)     Could not sleep well when taking     Doxycycline    Hay fever         Social History:   Social history was reviewed and updated with the patient.  Dell Lino  reports that he has quit smoking. His smoking use included Cigarettes. He does not have any smokeless tobacco history on file. He reports that he does not drink alcohol or use illicit drugs.  Works full time as a metal        Family History:   Family history was reviewed and updated with the patient.  No family history on file.  No known relatives with pulmonary PTX or disease       Review of Systems:   The 10 point Review of Systems is negative other than noted in the HPI or here.         Physical Exam:   Temp: 98.6  F  (37  C) Temp src: Oral BP: (!) 140/96 mmHg Pulse: 62   Resp: 16 SpO2: 97 % O2 Device: None (Room air)    165 lbs 9.61 oz    Constitutional: Awake, alert, cooperative, no apparent distress.   Psych: Alert, oriented to person, place and time, no obvious anxiety or depression.   Neuro: Cranial nerves 2-12 intact, normal strength and sensation.   Eyes: Conjunctiva and pupils examined and normal. EOMs intact   ENT: Moist mucous membranes, normal dentition.   Lymph: No anterior cervical or supraclavicular adenopathy.   Cardiovascular: Regular rate and rhythm, normal S1 and S2, and no murmur noted.   Respiratory: Clear to auscultation bilaterally, no crackles or wheezing, CT site with occlusive dressing- no kinks, serous output, no air leak to Chest Tube   GI: Soft, non-distended, non-tender   Skin/Integument: No rashes, no cyanosis, no edema.   Musculoskeletal: No joint swelling, erythema or tenderness.          Data:   All new lab and imaging data was reviewed.     Recent Labs  Lab 02/01/17  1004 02/01/17  1003   WBC 11.4*  --    HGB 15.2  --    MCV 88  --      --    INR 0.92  --    *  --    POTASSIUM 3.8  --    CHLORIDE 112*  --    CO2 26  --    BUN 21  --    CR 0.92  --    ANIONGAP 7  --    SARAH 9.0  --    *  --    TROPI <0.015The 99th percentile for upper reference range is 0.045 ug/L.  Troponin values in the range of 0.045 - 0.120 ug/L may be associated with risks of adverse clinical events.  --    TROPONIN  --  0.01      Radiographic review:  CXRs from 2/01 and 2/02 as well as 2/02/17 CT chest reviewed personally.  Initial large R PTX resolved competely with placement of right CT.  CT chest demonstrates apical blebs/bullae/emphysematous changes bilateral apices.  Very small residual right PTX. Chest tube in good position.

## 2017-02-02 NOTE — PLAN OF CARE
Problem: Goal Outcome Summary  Goal: Goal Outcome Summary  Outcome: No Change  Pt tolerates up with stand by assist chest tube patent to 20cm suction no air leak or crepitous denies need for pain medicine this shift pt aware of new order to clamp chest tube tonight

## 2017-02-02 NOTE — PLAN OF CARE
Problem: Goal Outcome Summary  Goal: Goal Outcome Summary  Outcome: No Change  VSS, sats ra, ctx1, no crep, no AL, po pain ok, site cdi.

## 2017-02-03 ENCOUNTER — APPOINTMENT (OUTPATIENT)
Dept: GENERAL RADIOLOGY | Facility: CLINIC | Age: 57
DRG: 201 | End: 2017-02-03
Attending: THORACIC SURGERY (CARDIOTHORACIC VASCULAR SURGERY)
Payer: COMMERCIAL

## 2017-02-03 VITALS
TEMPERATURE: 97.6 F | DIASTOLIC BLOOD PRESSURE: 85 MMHG | RESPIRATION RATE: 16 BRPM | BODY MASS INDEX: 22.22 KG/M2 | HEIGHT: 70 IN | WEIGHT: 155.2 LBS | OXYGEN SATURATION: 96 % | SYSTOLIC BLOOD PRESSURE: 125 MMHG | HEART RATE: 78 BPM

## 2017-02-03 PROCEDURE — 71010 XR CHEST PORT 1 VW: CPT

## 2017-02-03 PROCEDURE — 71010 XR CHEST PORT 1 VW: CPT | Mod: 76

## 2017-02-03 PROCEDURE — 25000132 ZZH RX MED GY IP 250 OP 250 PS 637: Performed by: PHYSICIAN ASSISTANT

## 2017-02-03 PROCEDURE — 25000125 ZZHC RX 250: Performed by: THORACIC SURGERY (CARDIOTHORACIC VASCULAR SURGERY)

## 2017-02-03 PROCEDURE — 99238 HOSP IP/OBS DSCHRG MGMT 30/<: CPT | Performed by: INTERNAL MEDICINE

## 2017-02-03 RX ORDER — GINSENG 100 MG
CAPSULE ORAL DAILY
Status: DISCONTINUED | OUTPATIENT
Start: 2017-02-03 | End: 2017-02-03 | Stop reason: HOSPADM

## 2017-02-03 RX ORDER — HYDROCODONE BITARTRATE AND ACETAMINOPHEN 5; 325 MG/1; MG/1
1-2 TABLET ORAL EVERY 4 HOURS PRN
Qty: 10 TABLET | Refills: 0 | Status: SHIPPED | OUTPATIENT
Start: 2017-02-03 | End: 2023-12-20

## 2017-02-03 RX ADMIN — SENNOSIDES AND DOCUSATE SODIUM 1 TABLET: 8.6; 5 TABLET ORAL at 08:36

## 2017-02-03 RX ADMIN — OXYBUTYNIN CHLORIDE 5 MG: 5 TABLET ORAL at 08:36

## 2017-02-03 RX ADMIN — BACITRACIN ZINC: 500 OINTMENT TOPICAL at 08:36

## 2017-02-03 NOTE — PLAN OF CARE
Problem: Goal Outcome Summary  Goal: Goal Outcome Summary  Outcome: No Change  VSS. Chest tube x 1, with air leak present - MD aware. Scant drainage in chamber. Right LS diminished. IS to 2500. Pt denies any pain on shift, took norco on evening. On reg diet. SBA.

## 2017-02-03 NOTE — PLAN OF CARE
Problem: Goal Outcome Summary  Goal: Goal Outcome Summary  Outcome: No Change  Pt is A&O x4, AVSS, denied pain up with SBA. Chest tube has air leak. Scant drain. Denied SOB.

## 2017-02-03 NOTE — DISCHARGE INSTRUCTIONS
"[facility]  Discharge Orders & Follow-up Care  Video-Assisted: Thoracoscopy - Thoracotomy    Call Laura at Dr. Mao  office at [phone] to make a return appointment with a chest x-ray on  February 13  Your appointment will be with either Micki Serna PA-C or Dr. Mao, or both.      Our office is located at:  [address], [address], [place] [id number].  Laura will explain where to park when you call for an appointment.    A. Patient Care  Call Dr Mao  office @ [phone] if:  ? Severe chills or a fever or 100.4 F.  temperature on two occasions  ? Increased incisional pain and/or redness  ? Presence of unusual incisional or chest tube site drainage  ? Coughing up bright red blood or greenish-yellow secretions  ? Significant increase in shortness of breath    Pain Relief  You have been given a prescription for narcotic pain medicine.  You may also take ibuprofen and acetaminophen over the counter.  Recommended dosages are:  600 mg Ibuprofen every 6 hours as needed and 650 mg Acetaminophen every 4 hours as needed.  Many patients get good pain relief by \"staggering\" these medications.     No driving while on narcotics.     Activity  XXX  No activity restrictions for a scope procedure/thoracoscopy  ___ No heavy lifting greater than 8-10 pounds on the operative side for 4-6 weeks for a thoracotomy    Wound Care  Remove all dressings in 48 hours and then you may shower.      Pneumothorax (Collapsed Lung)    A pneumothorax occurs when air fills the pleural cavity (the space between your lung and chest wall). This can cause all or part of your lung to collapse. The main cause of a pneumothorax is an injury to the chest cavity that punctures the lungs. Damage may result from a stab or gunshot wound, car accident, fall, or certain surgical procedures. In some cases, a pneumothorax happens spontaneously, without an obvious cause.   You're more likely to have " spontaneous pneumothorax if you smoke or have a chronic lung disease, such as emphysema.    When to Go to the Emergency Room (ER)  Serious pneumothorax can be fatal if not treated. Call 911 for a bad chest wound or any of the following symptoms:    Sudden, sharp chest pain that may spread to your shoulder or back    Shortness of breath; trouble breathing    A bluish color to the skin    Loss of consciousness or feeling faint with any of the above symptoms  What to Expect in the ER    You will be examined carefully.    Your lungs and heart will be listened to through a stethoscope.    You may have X-rays or a computed tomography (CT) scan. A CT scan combines X-rays and computer scans to provide detailed pictures of your lungs.    You will be given help with breathing if you need it.  Treatment    If the pneumothorax is small, you may stay in the ER for 5 to 6 hours to see if it gets any worse. If it does not get worse, you may be sent home without treatment and told to follow up with your regular doctor.    If the pneumothorax needs treatment, you will be admitted to the hospital. The air in your pleural cavity may be removed with a needle. Or, a hollow chest tube may be placed in your chest. This is attached to a suction device that removes the air. In that case, you will be admitted to the hospital for a few days.  After treatment, you will be told what to do to care for yourself and when to follow up with your doctor.    [id number] [employer]. [address] [place] [address], [place] [address]. All rights reserved. This information is not intended as a substitute for professional medical care. Always follow your healthcare professional's instructions.

## 2017-02-03 NOTE — PLAN OF CARE
Problem: Goal Outcome Summary  Goal: Goal Outcome Summary  Outcome: Completed Date Met:  02/03/17  A/Ox4, AVSS, tolerating reg diet. Chest tube spontaneously removed this AM. Occlusive dressing placed at site. sts chest xray ordered. MD contacted and updated. Per thoracic surgery patient OK to go and be seen on outpatient basis, resolution of pneumothorax at this time. LD diminished on right base, clear through remaining. BS active, passing flatus. Tolerating reg diet. No c/o pain. IS to 2500. All discharge instruction reviewed with patient, questions answered. Pt discharging to home.

## 2017-02-03 NOTE — PROGRESS NOTES
THORACIC SURGERY    CT out this am, no ptx on CXR after CT came out    Discussed  Ok to d/c f/u my office on 02-13  CT report reviewed  Bullous emphysema, some area of fibrosis    Risk of recurrence reviewed    MARCELA CASTILLO MD [facility] ONCOLOGY THORACIC SURGERY  CELL:  [phone]  OFFICE: [phone]

## 2017-02-03 NOTE — DISCHARGE SUMMARY
[facility]    Discharge Summary  Hospitalist    Date of Admission:  2/1/2017  Date of Discharge:  2/3/2017  Discharging Provider: Lewis Dey  Date of Service (when I saw the patient): 02/03/2017    Discharge Diagnoses  Spontaneous pneumothorax  Chronic obstructive pulmonary disease  Overactive bladder    History of Present Illness  Dell Lino is a 56 year old male who presented with chest pain and shortness of breath.     Hospital Course  Dell Lino was admitted on 2/1/2017.  The following problems were addressed during his hospitalization:    Spontaneous pneumothorax  Initial episode. Occuring in setting of underlying COPD with emphysema, and biapical fibrosis and bullous change noted on CT. Chest tube placed in Emergency Department and transferred to [facility] for Thoracic Surgery consultation. Chest tube spontaneously fell out and residual tiny right apical pneumothorax noted on follow-up imaging. Prescribed small amount of hydrocodone-acetaminophen for breakthrough pain. Will see Thoracic Surgery in follow-up following discharge.     COPD  Chronic, not in an acute exacerbation. Emphysema, and biapical fibrosis and bullous change noted on CT. Continue Advair.    Overactive bladder  Continue oxybutynin    Lewis Dey    Code Status  Full Code       Primary Care Physician  Jt Devine    Physical Exam  Temp: 97.6  F (36.4  C) Temp src: Oral BP: 125/85 mmHg Pulse: 78   Resp: 16 SpO2: 96 % O2 Device: None (Room air)    Filed Vitals:    02/01/17 1657 02/03/17 0401   Weight: 75.116 kg (165 lb 9.6 oz) 70.4 kg (155 lb 3.3 oz)     Vital Signs with Ranges  Temp:  [97.6  F (36.4  C)-98.7  F (37.1  C)] 97.6  F (36.4  C)  Pulse:  [67-78] 78  Resp:  [16] 16  BP: (124-132)/(75-87) 125/85 mmHg  SpO2:  [95 %-96 %] 96 %  I/O last 3 completed shifts:  In: 560 [P.O.:560]  Out: 5 [Chest Tube:5]    Constitutional: Well-appearing, NAD  Respiratory: Clear to auscultation bilaterally, good air  movement bilaterally  Cardiovascular: RRR, no m/r/g. No peripheral edema.  GI: Soft, non-tender, non-distended. BS normoactive.   Skin/Integumen: Warm, dry  Other:      Discharge Disposition  Discharged to home  Condition at discharge: Stable    Consultations This Hospital Stay  THORACIC SURGERY IP CONSULT    Time Spent on This Encounter  Lewis MORENO, personally saw the patient today and spent less than or equal to 30 minutes discharging this patient.    Discharge Orders    Reason for your hospital stay   You were hospitalized for a pneumothorax (air around your lungs)     Follow-up and recommended labs and tests    Follow-up with thoracic surgery (Dr. Mao) on 2/13/17. Follow-up with primary care provider, Jt Devine, as needed.     Activity   Your activity upon discharge: activity as tolerated     When to contact your care team   Return to the Emergency Department immediately if you feel sudden onset of chest pain and/or shortness of breath.     Full Code     Diet   Follow this diet upon discharge: Regular diet       Discharge Medications  Current Discharge Medication List      START taking these medications    Details   HYDROcodone-acetaminophen (NORCO) 5-325 MG per tablet Take 1-2 tablets by mouth every 4 hours as needed for moderate to severe pain  Qty: 10 tablet, Refills: 0    Associated Diagnoses: Primary spontaneous pneumothorax         CONTINUE these medications which have NOT CHANGED    Details   OXYBUTYNIN CHLORIDE PO Take 5 mg by mouth 3 times daily       fluticasone-salmeterol (ADVAIR) 100-50 MCG/DOSE diskus inhaler Inhale 1 puff into the lungs every 12 hours      Multiple Vitamins-Minerals (HM MULTIVITAMIN ADULT GUMMY PO) Take 1 chew tab by mouth daily      COLLAGEN PO Take 6 capsules by mouth daily Unknown capsule strength      albuterol (PROAIR HFA/PROVENTIL HFA/VENTOLIN HFA) 108 (90 BASE) MCG/ACT Inhaler Inhale 2 puffs into the lungs every 6 hours as needed for shortness of breath /  dyspnea or wheezing            Allergies  Allergies   Allergen Reactions     Codeine Other (See Comments)     Could not sleep well when taking     Doxycycline      Data  Most Recent 3 CBC's:  Recent Labs   Lab Test  02/01/17   1004   WBC  11.4*   HGB  15.2   MCV  88   PLT  159      Most Recent 3 BMP's:  Recent Labs   Lab Test  02/01/17   1004   NA  145*   POTASSIUM  3.8   CHLORIDE  112*   CO2  26   BUN  21   CR  0.92   ANIONGAP  7   SARAH  9.0   GLC  133*     Most Recent 2 LFT's:  Recent Labs   Lab Test  02/01/17   1004   AST  19   ALT  22   ALKPHOS  56   BILITOTAL  1.1     Most Recent INR's and Anticoagulation Dosing History:        Anticoagulation Dose History     Recent Dosing and Labs Latest Ref Rng 2/1/2017    INR 0.86 - 1.14 0.92        Most Recent 3 Troponin's:  Recent Labs   Lab Test  02/01/17   1004  02/01/17   1003   TROPI  <0.015  The 99th percentile for upper reference range is 0.045 ug/L.  Troponin values in   the range of 0.045 - 0.120 ug/L may be associated with risks of adverse   clinical events.     --    TROPONIN   --   0.01     Most Recent Cholesterol Panel:No lab results found.  Most Recent 6 Bacteria Isolates From Any Culture (See EPIC Reports for Culture Details):No lab results found.  Most Recent TSH, T4 and A1c Labs:No lab results found.  Results for orders placed or performed during the hospital encounter of 02/01/17   CT Chest w/o Contrast    Narrative    CT CHEST WITHOUT CONTRAST 2/2/2017 12:15 AM     HISTORY: Pneumothorax.    COMPARISON: None.    TECHNIQUE: Volumetric helical acquisition of CT images of the chest  from the clavicles to the kidneys were acquired without IV contrast.  Radiation dose for this scan was reduced using automated exposure  control, adjustment of the mA and/or kV according to patient size, or  iterative reconstruction technique.    FINDINGS:  Small bore pigtail catheter noted anteriorly. Small  residual pneumothorax at the base between the fissures and  anteriorly.  Some compressive atelectasis versus less likely infiltrate is seen  posteriorly on the right. Apical fibrosis and bullous change are seen  bilaterally. Mild-moderate areas of emphysema noted in the lungs.  Visualized upper abdomen unremarkable.      Impression    IMPRESSION: Emphysema, and biapical fibrosis and bullous change, small  residual pneumothorax on the right with chest tube in place.    WERO GALLARDO MD   XR Chest Port 1 View    Narrative    XR CHEST PORT 1 VW  2/2/2017 5:10 AM     HISTORY:  chest tube    COMPARISON: Film dated to/1/2017    FINDINGS:  Smallbore right chest tube is noted. The position of the  tube has changed since the previous film. The tip of the catheter is  now at the level of the right sixth rib. A small right pneumothorax  still appears to be present. It has decreased since the prior film.      Impression    IMPRESSION:  1. Right chest tube has been repositioned.  2. Decrease in the size of the right pneumothorax.    LISBETH TRENT MD   XR Chest Port 1 View    Narrative    CHEST ONE VIEW PORTABLE  2/3/2017 5:40 AM     COMPARISON: Two-view chest x-ray 2/2/2017.    HISTORY: Chest tube.      Impression    IMPRESSION: Small caliber pigtail right chest tube again noted. There  is no definite residual pneumothorax; however, bullous disease at the  right lung apex makes it difficult to exclude a small apical  pneumothorax. Emphysematous and fibrotic changes throughout both lungs  again noted. There are no airspace opacities to suggest pneumonia.  Heart size is normal with no evidence for congestive failure. There is  no pleural effusion.    MAYRA VELAZQUEZ MD   XR Chest Port 1 View    Narrative    CHEST PORTABLE ONE VIEW 2/3/2017 9:08 AM     HISTORY: Chest tube out.    COMPARISON: Chest x-ray 2/3/2017.    Portable view of the chest is performed. A tiny right apical  pneumothorax is noted and is unchanged following removal of the  pigtail catheter in the right hemithorax when compared  to prior exam.  Left lung is well expanded and clear. Chronic appearing interstitial  changes are stable. No infiltrate or consolidation. Heart is normal in  size. Probable azygos lobe is noted at the right lung apex. No pleural  fluid is evident.    LAKESHA SWANSON MD

## 2021-05-25 ENCOUNTER — RECORDS - HEALTHEAST (OUTPATIENT)
Dept: ADMINISTRATIVE | Facility: CLINIC | Age: 61
End: 2021-05-25

## 2021-05-27 ENCOUNTER — RECORDS - HEALTHEAST (OUTPATIENT)
Dept: ADMINISTRATIVE | Facility: CLINIC | Age: 61
End: 2021-05-27

## 2023-12-20 ENCOUNTER — APPOINTMENT (OUTPATIENT)
Dept: GENERAL RADIOLOGY | Facility: CLINIC | Age: 63
End: 2023-12-20
Attending: EMERGENCY MEDICINE
Payer: OTHER MISCELLANEOUS

## 2023-12-20 ENCOUNTER — ANCILLARY PROCEDURE (OUTPATIENT)
Dept: ULTRASOUND IMAGING | Facility: CLINIC | Age: 63
End: 2023-12-20
Attending: EMERGENCY MEDICINE
Payer: COMMERCIAL

## 2023-12-20 ENCOUNTER — APPOINTMENT (OUTPATIENT)
Dept: CT IMAGING | Facility: CLINIC | Age: 63
End: 2023-12-20
Attending: EMERGENCY MEDICINE
Payer: OTHER MISCELLANEOUS

## 2023-12-20 ENCOUNTER — APPOINTMENT (OUTPATIENT)
Dept: GENERAL RADIOLOGY | Facility: CLINIC | Age: 63
End: 2023-12-20
Attending: NURSE PRACTITIONER
Payer: OTHER MISCELLANEOUS

## 2023-12-20 ENCOUNTER — HOSPITAL ENCOUNTER (OUTPATIENT)
Facility: CLINIC | Age: 63
Setting detail: OBSERVATION
Discharge: HOME | End: 2023-12-21
Attending: EMERGENCY MEDICINE | Admitting: EMERGENCY MEDICINE
Payer: OTHER MISCELLANEOUS

## 2023-12-20 DIAGNOSIS — S22.31XA CLOSED FRACTURE OF ONE RIB OF RIGHT SIDE, INITIAL ENCOUNTER: ICD-10-CM

## 2023-12-20 DIAGNOSIS — S63.289A CLOSED TRAUMATIC DISLOCATION OF PROXIMAL INTERPHALANGEAL (PIP) JOINT OF FINGER OF RIGHT HAND: ICD-10-CM
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
## 2023-12-20 DIAGNOSIS — S27.0XXA TRAUMATIC PNEUMOTHORAX, INITIAL ENCOUNTER: Primary | ICD-10-CM

## 2023-12-20 DIAGNOSIS — J93.9 PNEUMOTHORAX ON RIGHT: ICD-10-CM

## 2023-12-20 PROBLEM — S22.41XA CLOSED TRAUMATIC FRACTURE OF RIBS OF RIGHT SIDE WITH PNEUMOTHORAX: Status: ACTIVE | Noted: 2023-12-20

## 2023-12-20 LAB
ANION GAP SERPL CALCULATED.3IONS-SCNC: 10 MMOL/L (ref 7–15)
ATRIAL RATE - MUSE: 85 BPM
BASOPHILS # BLD AUTO: 0.1 10E3/UL (ref 0–0.2)
BASOPHILS NFR BLD AUTO: 1 %
BUN SERPL-MCNC: 18.9 MG/DL (ref 8–23)
CALCIUM SERPL-MCNC: 9.3 MG/DL (ref 8.8–10.2)
CHLORIDE SERPL-SCNC: 103 MMOL/L (ref 98–107)
CREAT SERPL-MCNC: 0.92 MG/DL (ref 0.67–1.17)
DEPRECATED HCO3 PLAS-SCNC: 25 MMOL/L (ref 22–29)
DIASTOLIC BLOOD PRESSURE - MUSE: NORMAL MMHG
EGFRCR SERPLBLD CKD-EPI 2021: >90 ML/MIN/1.73M2
EOSINOPHIL # BLD AUTO: 0.2 10E3/UL (ref 0–0.7)
EOSINOPHIL NFR BLD AUTO: 3 %
ERYTHROCYTE [DISTWIDTH] IN BLOOD BY AUTOMATED COUNT: 13.8 % (ref 10–15)
GLUCOSE SERPL-MCNC: 116 MG/DL (ref 70–99)
HCT VFR BLD AUTO: 51.2 % (ref 40–53)
HGB BLD-MCNC: 16.8 G/DL (ref 13.3–17.7)
HOLD SPECIMEN: NORMAL
HOLD SPECIMEN: NORMAL
IMM GRANULOCYTES # BLD: 0.1 10E3/UL
IMM GRANULOCYTES NFR BLD: 1 %
INR PPP: 0.91 (ref 0.85–1.15)
INTERPRETATION ECG - MUSE: NORMAL
LYMPHOCYTES # BLD AUTO: 1.3 10E3/UL (ref 0.8–5.3)
LYMPHOCYTES NFR BLD AUTO: 13 %
MCH RBC QN AUTO: 28.7 PG (ref 26.5–33)
MCHC RBC AUTO-ENTMCNC: 32.8 G/DL (ref 31.5–36.5)
MCV RBC AUTO: 87 FL (ref 78–100)
MONOCYTES # BLD AUTO: 0.5 10E3/UL (ref 0–1.3)
MONOCYTES NFR BLD AUTO: 5 %
NEUTROPHILS # BLD AUTO: 7.6 10E3/UL (ref 1.6–8.3)
NEUTROPHILS NFR BLD AUTO: 77 %
NRBC # BLD AUTO: 0 10E3/UL
NRBC BLD AUTO-RTO: 0 /100
P AXIS - MUSE: 84 DEGREES
PLATELET # BLD AUTO: 215 10E3/UL (ref 150–450)
POTASSIUM SERPL-SCNC: 4.3 MMOL/L (ref 3.4–5.3)
PR INTERVAL - MUSE: 168 MS
QRS DURATION - MUSE: 94 MS
QT - MUSE: 356 MS
QTC - MUSE: 423 MS
R AXIS - MUSE: -12 DEGREES
RADIOLOGIST FLAGS: ABNORMAL
RBC # BLD AUTO: 5.86 10E6/UL (ref 4.4–5.9)
SODIUM SERPL-SCNC: 138 MMOL/L (ref 135–145)
SYSTOLIC BLOOD PRESSURE - MUSE: NORMAL MMHG
T AXIS - MUSE: 59 DEGREES
VENTRICULAR RATE- MUSE: 85 BPM
WBC # BLD AUTO: 9.8 10E3/UL (ref 4–11)

## 2023-12-20 PROCEDURE — 96376 TX/PRO/DX INJ SAME DRUG ADON: CPT

## 2023-12-20 PROCEDURE — 76705 ECHO EXAM OF ABDOMEN: CPT | Performed by: EMERGENCY MEDICINE

## 2023-12-20 PROCEDURE — 250N000011 HC RX IP 250 OP 636: Performed by: EMERGENCY MEDICINE

## 2023-12-20 PROCEDURE — 250N000011 HC RX IP 250 OP 636: Mod: JZ | Performed by: EMERGENCY MEDICINE

## 2023-12-20 PROCEDURE — 73140 X-RAY EXAM OF FINGER(S): CPT | Mod: RT

## 2023-12-20 PROCEDURE — 96374 THER/PROPH/DIAG INJ IV PUSH: CPT | Performed by: EMERGENCY MEDICINE

## 2023-12-20 PROCEDURE — 85025 COMPLETE CBC W/AUTO DIFF WBC: CPT | Performed by: EMERGENCY MEDICINE

## 2023-12-20 PROCEDURE — 93005 ELECTROCARDIOGRAM TRACING: CPT | Performed by: EMERGENCY MEDICINE

## 2023-12-20 PROCEDURE — 99291 CRITICAL CARE FIRST HOUR: CPT | Mod: 25 | Performed by: EMERGENCY MEDICINE

## 2023-12-20 PROCEDURE — 250N000013 HC RX MED GY IP 250 OP 250 PS 637: Performed by: NURSE PRACTITIONER

## 2023-12-20 PROCEDURE — 96376 TX/PRO/DX INJ SAME DRUG ADON: CPT | Performed by: EMERGENCY MEDICINE

## 2023-12-20 PROCEDURE — 999N000147 HC STATISTIC PT IP EVAL DEFER

## 2023-12-20 PROCEDURE — 250N000011 HC RX IP 250 OP 636: Performed by: NURSE PRACTITIONER

## 2023-12-20 PROCEDURE — 74177 CT ABD & PELVIS W/CONTRAST: CPT

## 2023-12-20 PROCEDURE — 999N000065 XR FINGER RIGHT G/E 2 VIEWS: Mod: RT

## 2023-12-20 PROCEDURE — 258N000003 HC RX IP 258 OP 636: Performed by: EMERGENCY MEDICINE

## 2023-12-20 PROCEDURE — G0378 HOSPITAL OBSERVATION PER HR: HCPCS

## 2023-12-20 PROCEDURE — 94640 AIRWAY INHALATION TREATMENT: CPT | Performed by: EMERGENCY MEDICINE

## 2023-12-20 PROCEDURE — 250N000009 HC RX 250: Performed by: EMERGENCY MEDICINE

## 2023-12-20 PROCEDURE — 36415 COLL VENOUS BLD VENIPUNCTURE: CPT | Performed by: EMERGENCY MEDICINE

## 2023-12-20 PROCEDURE — 999N000157 HC STATISTIC RCP TIME EA 10 MIN

## 2023-12-20 PROCEDURE — 93010 ELECTROCARDIOGRAM REPORT: CPT | Mod: 59 | Performed by: EMERGENCY MEDICINE

## 2023-12-20 PROCEDURE — 82374 ASSAY BLOOD CARBON DIOXIDE: CPT | Performed by: EMERGENCY MEDICINE

## 2023-12-20 PROCEDURE — 96375 TX/PRO/DX INJ NEW DRUG ADDON: CPT

## 2023-12-20 PROCEDURE — 71045 X-RAY EXAM CHEST 1 VIEW: CPT

## 2023-12-20 PROCEDURE — 26770 TREAT FINGER DISLOCATION: CPT | Performed by: EMERGENCY MEDICINE

## 2023-12-20 PROCEDURE — 76705 ECHO EXAM OF ABDOMEN: CPT | Mod: 26 | Performed by: EMERGENCY MEDICINE

## 2023-12-20 PROCEDURE — 96361 HYDRATE IV INFUSION ADD-ON: CPT | Mod: XU | Performed by: EMERGENCY MEDICINE

## 2023-12-20 PROCEDURE — 82310 ASSAY OF CALCIUM: CPT | Performed by: EMERGENCY MEDICINE

## 2023-12-20 PROCEDURE — 26770 TREAT FINGER DISLOCATION: CPT | Mod: F8 | Performed by: EMERGENCY MEDICINE

## 2023-12-20 PROCEDURE — 71045 X-RAY EXAM CHEST 1 VIEW: CPT | Mod: 26 | Performed by: RADIOLOGY

## 2023-12-20 PROCEDURE — 99222 1ST HOSP IP/OBS MODERATE 55: CPT | Performed by: NURSE PRACTITIONER

## 2023-12-20 PROCEDURE — 71045 X-RAY EXAM CHEST 1 VIEW: CPT | Mod: 77

## 2023-12-20 PROCEDURE — 85610 PROTHROMBIN TIME: CPT | Performed by: EMERGENCY MEDICINE

## 2023-12-20 RX ORDER — KETOROLAC TROMETHAMINE 30 MG/ML
30 INJECTION, SOLUTION INTRAMUSCULAR; INTRAVENOUS EVERY 6 HOURS
Qty: 20 ML | Refills: 0 | Status: DISCONTINUED | OUTPATIENT
Start: 2023-12-20 | End: 2023-12-21 | Stop reason: HOSPADM

## 2023-12-20 RX ORDER — ACETAMINOPHEN 500 MG
500-1000 TABLET ORAL EVERY 6 HOURS PRN
COMMUNITY

## 2023-12-20 RX ORDER — PEAK FLOW METER
EACH MISCELLANEOUS
COMMUNITY
Start: 2023-05-21

## 2023-12-20 RX ORDER — HYDROMORPHONE HYDROCHLORIDE 1 MG/ML
0.5 INJECTION, SOLUTION INTRAMUSCULAR; INTRAVENOUS; SUBCUTANEOUS
Status: DISCONTINUED | OUTPATIENT
Start: 2023-12-20 | End: 2023-12-21 | Stop reason: HOSPADM

## 2023-12-20 RX ORDER — OXYCODONE HYDROCHLORIDE 5 MG/1
5 TABLET ORAL EVERY 4 HOURS PRN
Status: DISCONTINUED | OUTPATIENT
Start: 2023-12-20 | End: 2023-12-21 | Stop reason: HOSPADM

## 2023-12-20 RX ORDER — ACETAMINOPHEN 325 MG/1
975 TABLET ORAL EVERY 8 HOURS
Status: DISCONTINUED | OUTPATIENT
Start: 2023-12-20 | End: 2023-12-21 | Stop reason: HOSPADM

## 2023-12-20 RX ORDER — METHOCARBAMOL 500 MG/1
500 TABLET, FILM COATED ORAL EVERY 6 HOURS PRN
Status: DISCONTINUED | OUTPATIENT
Start: 2023-12-20 | End: 2023-12-21

## 2023-12-20 RX ORDER — IOPAMIDOL 755 MG/ML
100 INJECTION, SOLUTION INTRAVASCULAR ONCE
Status: COMPLETED | OUTPATIENT
Start: 2023-12-20 | End: 2023-12-20

## 2023-12-20 RX ORDER — ALBUTEROL SULFATE 90 UG/1
2 AEROSOL, METERED RESPIRATORY (INHALATION) EVERY 6 HOURS PRN
Status: DISCONTINUED | OUTPATIENT
Start: 2023-12-20 | End: 2023-12-21 | Stop reason: HOSPADM

## 2023-12-20 RX ORDER — LIDOCAINE HYDROCHLORIDE 10 MG/ML
10 INJECTION, SOLUTION INFILTRATION; PERINEURAL ONCE
Status: COMPLETED | OUTPATIENT
Start: 2023-12-20 | End: 2023-12-20

## 2023-12-20 RX ORDER — IBUPROFEN 200 MG
200 TABLET ORAL EVERY 8 HOURS PRN
COMMUNITY

## 2023-12-20 RX ORDER — HYDROMORPHONE HYDROCHLORIDE 1 MG/ML
0.5 INJECTION, SOLUTION INTRAMUSCULAR; INTRAVENOUS; SUBCUTANEOUS ONCE
Status: COMPLETED | OUTPATIENT
Start: 2023-12-20 | End: 2023-12-20

## 2023-12-20 RX ORDER — HYDROMORPHONE HYDROCHLORIDE 1 MG/ML
0.5 INJECTION, SOLUTION INTRAMUSCULAR; INTRAVENOUS; SUBCUTANEOUS
Status: DISCONTINUED | OUTPATIENT
Start: 2023-12-20 | End: 2023-12-20

## 2023-12-20 RX ORDER — OXYBUTYNIN CHLORIDE 5 MG/1
5 TABLET ORAL 3 TIMES DAILY
Status: DISCONTINUED | OUTPATIENT
Start: 2023-12-20 | End: 2023-12-21 | Stop reason: HOSPADM

## 2023-12-20 RX ORDER — LIDOCAINE 4 G/G
1 PATCH TOPICAL
Status: DISCONTINUED | OUTPATIENT
Start: 2023-12-20 | End: 2023-12-21

## 2023-12-20 RX ORDER — GABAPENTIN 300 MG/1
300 CAPSULE ORAL 3 TIMES DAILY
Status: DISCONTINUED | OUTPATIENT
Start: 2023-12-20 | End: 2023-12-21 | Stop reason: HOSPADM

## 2023-12-20 RX ORDER — IPRATROPIUM BROMIDE AND ALBUTEROL SULFATE 2.5; .5 MG/3ML; MG/3ML
3 SOLUTION RESPIRATORY (INHALATION) ONCE
Status: COMPLETED | OUTPATIENT
Start: 2023-12-20 | End: 2023-12-20

## 2023-12-20 RX ORDER — CLOBETASOL PROPIONATE 0.5 MG/G
OINTMENT TOPICAL
COMMUNITY
Start: 2023-01-24

## 2023-12-20 RX ORDER — AMOXICILLIN 250 MG
1 CAPSULE ORAL 2 TIMES DAILY
Status: DISCONTINUED | OUTPATIENT
Start: 2023-12-20 | End: 2023-12-21 | Stop reason: HOSPADM

## 2023-12-20 RX ORDER — ONDANSETRON 4 MG/1
4 TABLET, ORALLY DISINTEGRATING ORAL EVERY 6 HOURS PRN
Status: DISCONTINUED | OUTPATIENT
Start: 2023-12-20 | End: 2023-12-21 | Stop reason: HOSPADM

## 2023-12-20 RX ORDER — FLUTICASONE FUROATE AND VILANTEROL 100; 25 UG/1; UG/1
1 POWDER RESPIRATORY (INHALATION) DAILY
Status: DISCONTINUED | OUTPATIENT
Start: 2023-12-20 | End: 2023-12-21 | Stop reason: HOSPADM

## 2023-12-20 RX ORDER — ONDANSETRON 2 MG/ML
4 INJECTION INTRAMUSCULAR; INTRAVENOUS EVERY 6 HOURS PRN
Status: DISCONTINUED | OUTPATIENT
Start: 2023-12-20 | End: 2023-12-21 | Stop reason: HOSPADM

## 2023-12-20 RX ORDER — OXYCODONE HYDROCHLORIDE 10 MG/1
10 TABLET ORAL EVERY 4 HOURS PRN
Status: DISCONTINUED | OUTPATIENT
Start: 2023-12-20 | End: 2023-12-21 | Stop reason: HOSPADM

## 2023-12-20 RX ADMIN — HYDROMORPHONE HYDROCHLORIDE 0.5 MG: 1 INJECTION, SOLUTION INTRAMUSCULAR; INTRAVENOUS; SUBCUTANEOUS at 08:56

## 2023-12-20 RX ADMIN — ACETAMINOPHEN 975 MG: 325 TABLET, FILM COATED ORAL at 20:19

## 2023-12-20 RX ADMIN — HYDROMORPHONE HYDROCHLORIDE 0.5 MG: 1 INJECTION, SOLUTION INTRAMUSCULAR; INTRAVENOUS; SUBCUTANEOUS at 07:24

## 2023-12-20 RX ADMIN — METHOCARBAMOL 500 MG: 500 TABLET ORAL at 17:01

## 2023-12-20 RX ADMIN — OXYCODONE HYDROCHLORIDE 10 MG: 10 TABLET ORAL at 17:01

## 2023-12-20 RX ADMIN — ACETAMINOPHEN 975 MG: 325 TABLET, FILM COATED ORAL at 12:20

## 2023-12-20 RX ADMIN — OXYBUTYNIN CHLORIDE 5 MG: 5 TABLET ORAL at 13:29

## 2023-12-20 RX ADMIN — LIDOCAINE HYDROCHLORIDE 10 ML: 10 INJECTION, SOLUTION INFILTRATION; PERINEURAL at 08:55

## 2023-12-20 RX ADMIN — SODIUM CHLORIDE 1000 ML: 9 INJECTION, SOLUTION INTRAVENOUS at 08:55

## 2023-12-20 RX ADMIN — SENNOSIDES AND DOCUSATE SODIUM 1 TABLET: 8.6; 5 TABLET ORAL at 12:20

## 2023-12-20 RX ADMIN — HYDROMORPHONE HYDROCHLORIDE 0.5 MG: 1 INJECTION, SOLUTION INTRAMUSCULAR; INTRAVENOUS; SUBCUTANEOUS at 11:24

## 2023-12-20 RX ADMIN — SODIUM CHLORIDE 42 ML: 9 INJECTION, SOLUTION INTRAVENOUS at 09:22

## 2023-12-20 RX ADMIN — LIDOCAINE 1 PATCH: 4 PATCH TOPICAL at 12:21

## 2023-12-20 RX ADMIN — KETOROLAC TROMETHAMINE 30 MG: 30 INJECTION, SOLUTION INTRAMUSCULAR; INTRAVENOUS at 13:29

## 2023-12-20 RX ADMIN — OXYCODONE HYDROCHLORIDE 10 MG: 10 TABLET ORAL at 21:27

## 2023-12-20 RX ADMIN — OXYBUTYNIN CHLORIDE 5 MG: 5 TABLET ORAL at 20:19

## 2023-12-20 RX ADMIN — GABAPENTIN 300 MG: 300 CAPSULE ORAL at 20:19

## 2023-12-20 RX ADMIN — IPRATROPIUM BROMIDE AND ALBUTEROL SULFATE 3 ML: .5; 3 SOLUTION RESPIRATORY (INHALATION) at 07:22

## 2023-12-20 RX ADMIN — SENNOSIDES AND DOCUSATE SODIUM 1 TABLET: 8.6; 5 TABLET ORAL at 20:19

## 2023-12-20 RX ADMIN — FLUTICASONE FUROATE AND VILANTEROL TRIFENATATE 1 PUFF: 100; 25 POWDER RESPIRATORY (INHALATION) at 13:29

## 2023-12-20 RX ADMIN — HYDROMORPHONE HYDROCHLORIDE 0.5 MG: 1 INJECTION, SOLUTION INTRAMUSCULAR; INTRAVENOUS; SUBCUTANEOUS at 08:25

## 2023-12-20 RX ADMIN — KETOROLAC TROMETHAMINE 30 MG: 30 INJECTION, SOLUTION INTRAMUSCULAR; INTRAVENOUS at 20:19

## 2023-12-20 RX ADMIN — IOPAMIDOL 84 ML: 755 INJECTION, SOLUTION INTRAVENOUS at 09:23

## 2023-12-20 RX ADMIN — OXYCODONE HYDROCHLORIDE 10 MG: 10 TABLET ORAL at 12:21

## 2023-12-20 ASSESSMENT — ACTIVITIES OF DAILY LIVING (ADL)
ADLS_ACUITY_SCORE: 35

## 2023-12-20 NOTE — PROGRESS NOTES
Pt transferred from [facility] ED. IV in place. Complaints of right sided rib pain. 2L O2 nasal cannula for comfort. Denies complaints of shortness of breath.

## 2023-12-20 NOTE — PROGRESS NOTES
Pt signed out to me by Dr.Paul [place]re[place] [facility]      Situation: Patient is a 63-year-old male who who was transferred over to the [facility] ER due to 2 rib fractures and a small pneumothorax.  Case was discussed with Dr. Contreras at [facility] regarding the transfer.    Plan: For the patient to be seen by general trauma team and be admitted to their service for further care.    Shift Report:  She was seen by the trauma team and they have assumed care.  I went and evaluated the patient.  He is comfortable laying in bed.  He is not in any acute distress.  His breathing is controlled and stable.    Signed:  Serenity Rojas MD  December 20, 2023 at 3:34 PM

## 2023-12-20 NOTE — H&P
[facility]  History and Physical Trauma Service     Date of Admission:  12/20/2023    Time of Admission/Consult Request (page/call): 12/20/2023    Time of my evaluation: 1230 (transferred from [facility])  Consulting services:    Assessment & Plan     Trauma mechanism:Fall from 3ft height  Time/date of injury:12/20 am  Known Injuries:   Small right pneumothorax.  Mildly displaced acute fracture of the right 11th rib posterolaterally.  History of Present Illness  Dell Lino is a 63 year old male with history of COPD, overactive bladder who presented to the Emergency department with right-sided chest pain and right fourth finger pain and deformity after a fall at work. He mis stepped on some hoses on a vehicle and fell to the ground. He struck his right chest and hand on the ground when he landed. He is not anticoagulated. CT CAP obtained notable for right posterior lateral 11th rib fracture, small pneumothorax and dislocated right middle phalanx of the 4th digit S/P closed reduction.  Admit to trauma obs  Neuro/Pain/Psych:  # Acute traumatic pain   - Scheduled: Tylenol, toradol, and Lidoderm patches  - Prn: Oxycodone, IV hydromorphone for breakthrough pain  Pulmonary:  # Hx COPD  # Traumatic non displaced fracture of the right posterior lateral 11th rib  On 2 L NC, uses IS upto 1250ml, right sided rib pain with movement and cough.   - Rib fracture management protocol: aggressive pulmonary hygiene, Incentive spirometer hourly, early mobility, OOB  - Supplemental oxygen to keep saturation above 92 %.  - CXR daily, eval for possible pneumo expansion  - Resume PTA Advair q12 hours and PRN albuterol inhaler  Cardiovascular:    - Monitor hemodynamic status.   GI/Nutrition:    Regular diet   PRN bowel regimen  Renal/ Fluids/Electrolytes:  # OAB  Resume PTA Oxybutynin  Electrolytes are balanced  - electrolyte replacement protocol in place.   Endocrine  - No management  indication.   Infectious disease:   - No indications for antibiotics.   Hematology:    - Hgb16.8g/dL  - Plt 215. Monitor   - Threshold for transfusion if hgb <7.0 or signs/symptoms of hypoperfusion.     # DVT Prophylaxis: Ambulate, Lovenox daily  Musculoskeletal:  # Fall  # Dorsal dislocation of the middle phalanx of the fourth digit at the PIP joint, S/P closed reduction  Follow up with sports medicine Orthopedic Surgery Outpatient  - Finger frog splint applied, RICE therapy  - Physical and occupational therapy consults.  Code status: Full code   General Cares:  GI Prophylaxis: PTA  DVT Prophylaxis: Lovenox  Date of last stool/Bowel Regimen:PTA  Pulmonary toilet:IS  ETOH: This patient was asked if in the last 3-6 months there has been a time when he had  5 or more drinks in a single day/outing.. Patient answer to the screening question was in the negative. No intervention needed.  Primary Care Physician   Jt Devine    Chief Complaint   Fall, chest wall and right finger pain  Past Medical History    I have reviewed this patient's medical history and updated it with pertinent information if needed.   Past Medical History:   Diagnosis Date    COPD (chronic obstructive pulmonary disease) (H)     Overactive bladder        Past Surgical History   I have reviewed this patient's surgical history and updated it with pertinent information if needed.  No past surgical history on file.  Prior to Admission Medications   Prior to Admission Medications   Prescriptions Last Dose Informant Patient Reported? Taking?   COLLAGEN PO  Self Yes No   Sig: Take 6 capsules by mouth daily Unknown capsule strength   HYDROcodone-acetaminophen (NORCO) 5-325 MG per tablet   No No   Sig: Take 1-2 tablets by mouth every 4 hours as needed for moderate to severe pain   Multiple Vitamins-Minerals (HM MULTIVITAMIN ADULT GUMMY PO)  Self Yes No   Sig: Take 1 chew tab by mouth daily   OXYBUTYNIN CHLORIDE PO  Self Yes No   Sig: Take 5 mg by mouth 3  times daily    albuterol (PROAIR HFA/PROVENTIL HFA/VENTOLIN HFA) 108 (90 BASE) MCG/ACT Inhaler  Self Yes No   Sig: Inhale 2 puffs into the lungs every 6 hours as needed for shortness of breath / dyspnea or wheezing    fluticasone-salmeterol (ADVAIR) 100-50 MCG/DOSE diskus inhaler  Self Yes No   Sig: Inhale 1 puff into the lungs every 12 hours      Facility-Administered Medications: None     Allergies   Allergies   Allergen Reactions    Codeine Other (See Comments)     Could not sleep well when taking    Doxycycline        Social History   Social History     Socioeconomic History    Marital status:      Spouse name: Not on file    Number of children: Not on file    Years of education: Not on file    Highest education level: Not on file   Occupational History    Not on file   Tobacco Use    Smoking status: Former     Types: Cigarettes    Smokeless tobacco: Not on file    Tobacco comments:     on adn off smoking for 35-40 years (1ppd) quit, a 2012   Substance and Sexual Activity    Alcohol use: No     Alcohol/week: 0.0 standard drinks of alcohol     Comment: Quit 1994    Drug use: No    Sexual activity: Yes   Other Topics Concern    Not on file   Social History Narrative    Single male, girlfriend in the [facility]. Works at [employer].     Social Determinants of Health     Financial Resource Strain: Not on file   Food Insecurity: Not on file   Transportation Needs: Not on file   Physical Activity: Not on file   Stress: Not on file   Social Connections: Not on file   Interpersonal Safety: Not on file   Housing Stability: Not on file       Family History   I have reviewed this patient's family history and updated it with pertinent information if needed.   No family history on file.    Review of Systems   CONSTITUTIONAL: No fever, chills, sweats, fatigue   EYES: no visual blurring, no double vision or visual loss  ENT: no decrease in hearing, no tinnitus, no vertigo, no hoarseness  RESPIRATORY: Hx childhood  asthma  CARDIOVASCULAR: no palpitations, no chest  pain, no exertional chest pain or pressure  GASTROINTESTINAL: no nausea or vomiting, or abd pain  GENITOURINARY: no dysuria, no frequency or hesitancy, no hematuria  MUSCULOSKELETAL: no weakness, no redness, no swelling, no joint pain,   SKIN: no rashes, ecchymoses, abrasions or lacerations  NEUROLOGIC: no numbness or tingling of hands, no numbness or tingling  of feet, no syncope, no tremors or weakness  PSYCHIATRIC: no sleep disturbances, no anxiety or depression    Physical Exam   Temp: 97.7  F (36.5  C) Temp src: Oral BP: (!) 140/84 Pulse: 74   Resp: 25 SpO2: 99 % O2 Device: None (Room air)    Vital Signs with Ranges  Temp:  [97.7  F (36.5  C)] 97.7  F (36.5  C)  Pulse:  [69-83] 74  Resp:  [13-25] 25  BP: (122-170)/(84-93) 140/84  SpO2:  [82 %-99 %] 99 % 173 lbs 4.8 oz    Primary Survey:  Airway: patient talking  Breathing: symmetric respiratory effort bilaterally  Circulation: central pulses present and peripheral pulses present  Disability: Pupils - left 4 mm and brisk, right 4 mm and brisk   Hattie Coma Scale - Total 15/15  Eye Response (E): 4  4= spontaneous,  3= to verbal/voice, 2=  to pain, 1= No response   Verbal Response (V): 5   5= Orientated, converses,  4= Confused, converses, 3= Inappropriate words,  2= Incomprehensible sounds,  1=No response   Motor Response (M): 6   6= Obeys commands, 5= Localizes to pain, 4= Withdrawal to pain, 3=Fexion to pain, 2= Extension to pain, 1= No response    Secondary Survey:  General: alert, oriented to person, place, time  Neuro: PERRLA. EOMI. CN II-XII grossly intact. No focal deficits. Strength 5/5 x 4 extremities.  Sensation intact.  Head: atraumatic, normocephalic, trachea midline  Eyes:  Pupils 3mm, EOMI, corneas and conjunctivae clear  Nose: nares patent, no drainage, nasal septum non-tender  Mouth/Throat: no exudates or erythema,  no dental tenderness or malocclusions, no tongue lacerations  Neck:  No cervical  collar present. No midline posterior tenderness, full AROM without pain.   Chest/Pulmonary: normal respiratory rate and rhythm,  bilateral clear breath sounds, no wheezes, rales or rhonchi, no chest wall tenderness or deformities,   Cardiovascular: S1, S2,  normal and regular rate and rhythm, no murmurs  Abdomen: soft, non-tender, no guarding, no rebound tenderness and no tenderness to palpation  : normal external genitalia, pelvis stable to lateral compression,  urine clear yellow  Musculoskel/Extremities: normal extremities, full AROM of major joints without tenderness, edema, erythema, ecchymosis, or abrasions. +2 PP. no edema.   Back/Spine: no deformity, no midline tenderness, no sacral tenderness, no step-offs and no abrasions or contusions  Hands: no gross deformities of hands or fingers. Full AROM of hand and fingers in flexion and extension.  strength equal and symmetric.   Psychiatric: affect/mood normal, cooperative, normal judgement/insight and memory intact  Skin: no rashes, laceration, ecchymosis, skin warm and dry.     Results for orders placed or performed during the hospital encounter of 12/20/23 (from the past 24 hour(s))   POC US ABDOMEN LIMITED    Impression    Bedside FAST (Focused Assessment with Sonography in Trauma), performed and interpreted by me.   Indication: Trauma    With the patient in Trendelenburg, the RUQ, LUQ and subxiphoid views were examined for intraabdominal and thoracic free fluid and pericardial effusion. With the patient in reverse Trendelenburg, the suprapubic view was examined for intraabdominal free fluid. Image quality was satisfactory..     Findings: There is no evidence of free fluid above or below bilateral diaphragms, in the splenorenal or hepatorenal space, or in bilateral paracolic gutters. There was no free fluid seen in the pelvis adjacent to the urinary bladder.   Extended FAST exam (eFAST):   Indication: Trauma   The chest wall was evaluated for evidence  of pneumothorax.     Right side:  Lung sliding artifact  Present     Comet tail artifacts or B-lines  Absent   Left side:  Lung sliding artifact  Present     Comet tail artifacts or B-lines Absent       IMPRESSION:  Negative FAST    CBC with platelets differential    Narrative    The following orders were created for panel order CBC with platelets differential.  Procedure                               Abnormality         Status                     ---------                               -----------         ------                     CBC with platelets and d...[[id number]]                      Final result                 Please view results for these tests on the individual orders.   Basic metabolic panel   Result Value Ref Range    Sodium 138 135 - 145 mmol/L    Potassium 4.3 3.4 - 5.3 mmol/L    Chloride 103 98 - 107 mmol/L    Carbon Dioxide (CO2) 25 22 - 29 mmol/L    Anion Gap 10 7 - 15 mmol/L    Urea Nitrogen 18.9 8.0 - 23.0 mg/dL    Creatinine 0.92 0.67 - 1.17 mg/dL    GFR Estimate >90 >60 mL/min/1.73m2    Calcium 9.3 8.8 - 10.2 mg/dL    Glucose 116 (H) 70 - 99 mg/dL   INR   Result Value Ref Range    INR 0.91 0.85 - 1.15   CBC with platelets and differential   Result Value Ref Range    WBC Count 9.8 4.0 - 11.0 10e3/uL    RBC Count 5.86 4.40 - 5.90 10e6/uL    Hemoglobin 16.8 13.3 - 17.7 g/dL    Hematocrit 51.2 40.0 - 53.0 %    MCV 87 78 - 100 fL    MCH 28.7 26.5 - 33.0 pg    MCHC 32.8 31.5 - 36.5 g/dL    RDW 13.8 10.0 - 15.0 %    Platelet Count 215 150 - 450 10e3/uL    % Neutrophils 77 %    % Lymphocytes 13 %    % Monocytes 5 %    % Eosinophils 3 %    % Basophils 1 %    % Immature Granulocytes 1 %    NRBCs per 100 WBC 0 <1 /100    Absolute Neutrophils 7.6 1.6 - 8.3 10e3/uL    Absolute Lymphocytes 1.3 0.8 - 5.3 10e3/uL    Absolute Monocytes 0.5 0.0 - 1.3 10e3/uL    Absolute Eosinophils 0.2 0.0 - 0.7 10e3/uL    Absolute Basophils 0.1 0.0 - 0.2 10e3/uL    Absolute Immature Granulocytes 0.1 <=0.4 10e3/uL    Absolute  NRBCs 0.0 10e3/uL   EKG 12-lead, tracing only   Result Value Ref Range    Systolic Blood Pressure  mmHg    Diastolic Blood Pressure  mmHg    Ventricular Rate 85 BPM    Atrial Rate 85 BPM    MO Interval 168 ms    QRS Duration 94 ms     ms    QTc 423 ms    P Axis 84 degrees    R AXIS -12 degrees    T Axis 59 degrees    Interpretation ECG       Sinus rhythm  Inferior infarct , age undetermined  Abnormal ECG  Unconfirmed report - interpretation of this ECG is computer generated - see medical record for final interpretation    Confirmed by - EMERGENCY ROOM, PHYSICIAN ([id number]),  PATEL MCCRAY ([id number]) on 12/20/2023 10:50:52 AM     [place] [facility]    Narrative    The following orders were created for panel order [place] [facility].  Procedure                               Abnormality         Status                     ---------                               -----------         ------                     Extra Red Top Tube[[id number]]                               Final result               Extra Green Top (Lithium...[[id number]]                      Final result               Extra Purple Top Tube[[id number]]                                                         Please view results for these tests on the individual orders.   Extra Red Top Tube   Result Value Ref Range    Hold Specimen JIC    Extra Green Top (Lithium Heparin) Tube   Result Value Ref Range    Hold Specimen JIC    XR Chest Port 1 View    Narrative    XR CHEST PORT 1 VIEW 12/20/2023 7:47 AM    HISTORY: Chest pain, shortness of breath, trauma    COMPARISON: 2/3/2017 chest x-ray      Impression    IMPRESSION: Acute nondisplaced right eighth rib fracture. No focal  airspace opacities, pleural effusion or pneumothorax. Few small  nodular opacities in the right lung apex are indeterminate, may be due  to superimposed soft tissue shadows or underlying nodules. Consider a  follow-up chest CT for further evaluation. Mild bronchiectasis in the  right upper lobe. The  cardiac and mediastinal silhouettes are normal.     JOLANTA COWAN MD         SYSTEM ID:  [id number]   XR Finger Port Right G/E 2 Views    Narrative    EXAM: XR FINGER PORT RIGHT G/E 2 VIEWS  DATE/TIME: 12/20/2023 7:48 AM    INDICATION: pain, trauma  COMPARISON: None available.       Impression    IMPRESSION: Dorsal dislocation of the middle phalanx of the fourth  digit at the PIP joint. Curvilinear ossific densities at the volar  margin of the head of the fourth proximal phalanx, likely mildly  displaced avulsion fracture fragments.     Irregular contour of the articular surface of the head of the fourth  metacarpal with subtle articular step-off which may be sequela of  age-indeterminate trauma.    Soft tissue edema about the fourth digit.    NOTE: ABNORMAL REPORT    THE DICTATION ABOVE DESCRIBES AN ABNORMAL REPORT FOR WHICH FOLLOW-UP  IS NEEDED.         MARTIN CHAVEZ DO         SYSTEM ID:  [id number]   CT Chest/Abdomen/Pelvis w Contrast   Result Value Ref Range    Radiologist flags Small right pneumothorax (AA)     Narrative    CT CHEST/ABDOMEN/PELVIS WITH CONTRAST December 20, 2023 9:36 AM    CLINICAL HISTORY: Trauma. Fall. Right-sided chest, flank and right  upper quadrant pain.    TECHNIQUE: CT scan of the chest, abdomen, and pelvis was performed  following injection of IV contrast. Multiplanar reformats were  obtained. Dose reduction techniques were used.   CONTRAST: 84mL Isovue 370.    COMPARISON: Chest CT performed 2/2/2017.    FINDINGS:   LUNGS AND PLEURA: Small right pneumothorax. Moderate to severe  emphysematous changes in both lungs. Mild biapical scarring. Irregular  nodular densities at both lung apices are not significantly changed,  and are also likely related to scarring. Mild scattered mucous  plugging bilaterally. There are scattered small indeterminate nodular  opacities in the left lung, not visualized on the previous exam, with  the largest in the left upper lobe measuring 0.9 cm (series 4  image  98) No pleural effusions.    MEDIASTINUM/AXILLAE: A mildly enlarged precarinal lymph node measures  1.1 cm, increased slightly in size since the previous exam (series 3  image 117). No other enlarged lymph nodes are identified in chest. No  pericardial effusion.    CORONARY ARTERY CALCIFICATION: Mild.    HEPATOBILIARY: A few tiny hypodensities in the liver are too small to  characterize, but could represent cysts. No calcified gallstones.    PANCREAS: Normal.    SPLEEN: Normal.    ADRENAL GLANDS: Normal.    KIDNEYS/BLADDER: A few small left renal cysts, for which no specific  follow-up would be recommended. No hydronephrosis.    BOWEL: No bowel obstruction. Sigmoid diverticulosis. No convincing  evidence for colitis or diverticulitis. Unremarkable appendix.    PELVIC ORGANS: Unremarkable.    LYMPH NODES: No enlarged lymph nodes are identified in the abdomen or  pelvis.    VASCULATURE: Mild atherosclerotic calcification. Dilatation of the  celiac artery measures up to 1.4 cm, similar to the previous exam.    ADDITIONAL FINDINGS: Small fat-containing paraumbilical hernia.    MUSCULOSKELETAL: Mildly displaced acute fracture of the right 11th rib  posterolaterally. Degenerative changes in the thoracolumbar spine.      Impression    IMPRESSION:   1.  Small right pneumothorax.  2.  Mildly displaced acute fracture of the right 11th rib  posterolaterally.  3.  Emphysema.  4.  A few scattered indeterminate pulmonary nodules in the left lung,  with the largest in the left upper lobe measuring 0.9 cm. A follow-up  CT in three months is recommended.  5.  Mild scattered mucous plugging in both lungs.  6.  A mildly enlarged precarinal lymph node in the mediastinum has  increased slightly in size.  7.  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    [Critical Result: Small right pneumothorax]    Finding was identified on 12/20/2023 10:08 AM.     Dr. Contreras was contacted by me on  12/20/2023 10:08 AM and verbalized  understanding of the critical result.     MAXWELL PATTON MD         SYSTEM ID:  [id number]   XR Finger Right 2 Views    Narrative    EXAM: XR FINGER RIGHT G/E 2 VIEWS  DATE/TIME: 12/20/2023 9:49 AM    INDICATION: Postreduction  COMPARISON: Same day for the digit radiographs.      Impression    IMPRESSION: Interval reduction of the fourth PIP joint which is now  normally aligned. Previously described curvilinear ossific densities  at the volar margin of the head of the fourth proximal phalanx are  less conspicuous which is likely related to slight differences in  positioning. Remainder unchanged.    MARTIN CHAVEZ DO         SYSTEM ID:  [id number]   -Dislocation - Upper Extremity    Narrative    Warner Contreras MD     12/20/2023 11:53 AM  [facility]    -Dislocation - Upper Extremity    Date/Time: 12/20/2023 11:52 AM    Performed by: Warner Contreras MD  Authorized by: Warner Contreras MD    Risks, benefits and alternatives discussed.      LOCATION     Location:  Finger    PRE PROCEDURE ASSESSMENT      Pre-procedure imaging:  X-ray    Imaging findings: dislocation present      Distal perfusion: normal      ANESTHESIA (see MAR for exact dosages)      Anesthesia method:  Nerve block    Block location:  Digital block    Block needle gauge:  27 G    Block anesthetic:  Lidocaine 1% w/o epi    Block technique:  Digital block    PROCEDURE DETAILS      Manipulation performed: yes      Finger reduction method:  Direct traction    Reduction successful: yes      Reduction confirmed with imaging: yes      POST PROCEDURE DETAILS     Neurological function: normal      Distal perfusion: normal      Range of motion: improved        PROCEDURE    Patient Tolerance:  Patient tolerated the procedure well with no immediate   complications       Studies:  XR Finger Right 2 Views   Final Result   IMPRESSION: Interval reduction of the fourth PIP joint which is now    normally aligned. Previously described curvilinear ossific densities   at the volar margin of the head of the fourth proximal phalanx are   less conspicuous which is likely related to slight differences in   positioning. Remainder unchanged.      MARTIN CHAVEZ DO            SYSTEM ID:  [id number]      CT Chest/Abdomen/Pelvis w Contrast   Preliminary Result   Abnormal   IMPRESSION:    1.  Small right pneumothorax.   2.  Mildly displaced acute fracture of the right 11th rib   posterolaterally.   3.  Emphysema.   4.  A few scattered indeterminate pulmonary nodules in the left lung,   with the largest in the left upper lobe measuring 0.9 cm. A follow-up   CT in three months is recommended.   5.  Mild scattered mucous plugging in both lungs.   6.  A mildly enlarged precarinal lymph node in the mediastinum has   increased slightly in size.   7.  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      [Critical Result: Small right pneumothorax]      Finding was identified on 12/20/2023 10:08 AM.       Dr. Contreras was contacted by me on 12/20/2023 10:08 AM and verbalized   understanding of the critical result.       XR Finger Port Right G/E 2 Views   Final Result   IMPRESSION: Dorsal dislocation of the middle phalanx of the fourth   digit at the PIP joint. Curvilinear ossific densities at the volar   margin of the head of the fourth proximal phalanx, likely mildly   displaced avulsion fracture fragments.       Irregular contour of the articular surface of the head of the fourth   metacarpal with subtle articular step-off which may be sequela of   age-indeterminate trauma.      Soft tissue edema about the fourth digit.      NOTE: ABNORMAL REPORT      THE DICTATION ABOVE DESCRIBES AN ABNORMAL REPORT FOR WHICH FOLLOW-UP   IS NEEDED.             MARTIN CHAVEZ DO            SYSTEM ID:  [id number]      XR Chest Port 1 View   Final Result   IMPRESSION: Acute nondisplaced right eighth rib  fracture. No focal   airspace opacities, pleural effusion or pneumothorax. Few small   nodular opacities in the right lung apex are indeterminate, may be due   to superimposed soft tissue shadows or underlying nodules. Consider a   follow-up chest CT for further evaluation. Mild bronchiectasis in the   right upper lobe. The cardiac and mediastinal silhouettes are normal.       JOLANTA COWAN MD            SYSTEM ID:  [id number]      POC US ABDOMEN LIMITED   Final Result   Bedside FAST (Focused Assessment with Sonography in Trauma), performed and interpreted by me.    Indication: Trauma      With the patient in Trendelenburg, the RUQ, LUQ and subxiphoid views were examined for intraabdominal and thoracic free fluid and pericardial effusion. With the patient in reverse Trendelenburg, the suprapubic view was examined for intraabdominal free fluid. Image quality was satisfactory..       Findings: There is no evidence of free fluid above or below bilateral diaphragms, in the splenorenal or hepatorenal space, or in bilateral paracolic gutters. There was no free fluid seen in the pelvis adjacent to the urinary bladder.    Extended FAST exam (eFAST):    Indication: Trauma    The chest wall was evaluated for evidence of pneumothorax.       Right side:  Lung sliding artifact  Present      Comet tail artifacts or B-lines  Absent    Left side:  Lung sliding artifact  Present      Comet tail artifacts or B-lines Absent          IMPRESSION:  Negative FAST           RON Spring CNP  Primary team: Trauma Services   Job code pager [phone] (24 hours a day)

## 2023-12-20 NOTE — ED NOTES
Reports given to Aydin STEINBERG on [place] ED as well as EMS team     Medicated for transfer with dilaudid, pain currently 8/10, patient is a reluctant  of pain    Breath sound auscultated prior to departure, audible over both lungs

## 2023-12-20 NOTE — ED TRIAGE NOTES
Triage Assessment (Adult)       Row Name 12/20/23 0707          Triage Assessment    Airway WDL WDL        Respiratory WDL    Respiratory WDL WDL        Skin Circulation/Temperature WDL    Skin Circulation/Temperature WDL WDL        Cardiac WDL    Cardiac WDL WDL        Peripheral/Neurovascular WDL    Peripheral Neurovascular WDL WDL        Cognitive/Neuro/Behavioral WDL    Cognitive/Neuro/Behavioral WDL WDL

## 2023-12-20 NOTE — PLAN OF CARE
ED PT Deferral: Patient mobilizing IND despite pain. Encouraged to ambulate regularly while in hospital. Will defer PT orders at this time.

## 2023-12-20 NOTE — MEDICATION SCRIBE - ADMISSION MEDICATION HISTORY
Medication Scribe Admission Medication History    Admission medication history is complete. The information provided in this note is only as accurate as the sources available at the time of the update.    Information Source(s): Patient and [email or web address]/[employer] via in-person    Pertinent Information: Patient was not sure of the name of the ointment he is currently using (PRN).    Changes made to PTA medication list:  Added: acetaminophen 500 mg, ibuprofen 200 mg, clobetasol ointment, nebulizer.  Deleted: collagen po (not taking), hydrocodone-acetaminophen tablet (not taking), multiple vitamins (not taking), oxybutynin chloride tablet (not taking).  Changed: None    Medication Affordability:  Not including over the counter (OTC) medications, was there a time in the past 3 months when you did not take your medications as prescribed because of cost?: No    Allergies reviewed with patient and updates made in EHR: yes    Medication History Completed By: Rosemarie Bell 12/20/2023 12:40 PM    Prior to Admission medications    Medication Sig Last Dose Taking? Auth Provider Long Term End Date   acetaminophen (TYLENOL) 500 MG tablet Take 500-1,000 mg by mouth every 6 hours as needed for mild pain 12/20/2023 at 0400 Yes Reported, Patient     albuterol (PROAIR HFA/PROVENTIL HFA/VENTOLIN HFA) 108 (90 BASE) MCG/ACT Inhaler Inhale 2 puffs into the lungs every 6 hours as needed for shortness of breath / dyspnea or wheezing  More than a month at UNKNOWN Yes Unknown, Entered By History Yes    clobetasol (TEMOVATE) 0.05 % external ointment APPLY EXTERNALLY TO THE AFFECTED AREA TWICE DAILY Past Week at UNKNOWN Yes Reported, Patient     fluticasone-salmeterol (ADVAIR) 100-50 MCG/DOSE diskus inhaler Inhale 1 puff into the lungs every 12 hours 12/20/2023 at AM Yes Unknown, Entered By History No    ibuprofen (ADVIL/MOTRIN) 200 MG tablet Take 200 mg by mouth every 8 hours as needed for pain Past Week at UNKNOWN Yes Reported, Patient      Nebulizers (VIOS AEROSOL DELIVERY SYSTEM) MISC AS DIRECTED FOR HOME USE Past Month at UNKNOWN Yes Reported, Patient

## 2023-12-20 NOTE — ED PROVIDER NOTES
ED Provider Note  [facility]      History     Chief Complaint   Patient presents with    Fall     Fell off work vehicle, about three feet up.  Vehicle parked.  Right hand fourth finger deformity.  Right side body and leg hurt.  SOB.  No head injury.     HPI  Dell Lino is a 63 year old right-hand-dominant male with history of COPD who presents to the emergency department with right-sided chest pain and right fourth finger pain and deformity after a fall at work.  Patient states that he was hooking up some hoses on a vehicle about 3 feet off the ground when he missed stepped and fell to the ground.  Patient states that he struck his right chest and hand on the ground when he landed.  He denies any head injury or loss of consciousness.  No neck pain.  Patient complains of pain in the right anterolateral and posterior lower thorax as well as pain and deformity in his right fourth finger.  He denies any weakness or numbness.    Past Medical History  Past Medical History:   Diagnosis Date    COPD (chronic obstructive pulmonary disease) (H)     Overactive bladder      No past surgical history on file.  albuterol (PROAIR HFA/PROVENTIL HFA/VENTOLIN HFA) 108 (90 BASE) MCG/ACT Inhaler  COLLAGEN PO  fluticasone-salmeterol (ADVAIR) 100-50 MCG/DOSE diskus inhaler  HYDROcodone-acetaminophen (NORCO) 5-325 MG per tablet  Multiple Vitamins-Minerals (HM MULTIVITAMIN ADULT GUMMY PO)  OXYBUTYNIN CHLORIDE PO      Allergies   Allergen Reactions    Codeine Other (See Comments)     Could not sleep well when taking    Doxycycline      Family History  No family history on file.  Social History   Social History     Tobacco Use    Smoking status: Former     Types: Cigarettes    Tobacco comments:     on adn off smoking for 35-40 years (1ppd) quit, a 2012   Substance Use Topics    Alcohol use: No     Alcohol/week: 0.0 standard drinks of alcohol     Comment: Quit 1994    Drug use: No         A medically appropriate  review of systems was performed with pertinent positives and negatives noted in the HPI, and all other systems negative.    Physical Exam   BP: (!) 170/86  Pulse: 83  Temp: 97.7  F (36.5  C)  Resp: 20  Weight: 78.6 kg (173 lb 4.8 oz)  SpO2: 97 %  Physical Exam  Vitals and nursing note reviewed.   Constitutional:       General: He is in acute distress.      Appearance: Normal appearance. He is not diaphoretic.   HENT:      Head: Normocephalic and atraumatic.   Eyes:      General: No scleral icterus.     Pupils: Pupils are equal, round, and reactive to light.   Cardiovascular:      Rate and Rhythm: Normal rate and regular rhythm.      Pulses: Normal pulses.      Heart sounds: Normal heart sounds.   Pulmonary:      Effort: No respiratory distress.      Breath sounds: Wheezing present.      Comments: Symmetric breath sounds  Abdominal:      General: Bowel sounds are normal.      Palpations: Abdomen is soft.      Tenderness: There is no abdominal tenderness.   Musculoskeletal:         General: Deformity (Right fourth finger) present. No tenderness.   Skin:     General: Skin is warm.      Findings: No rash.   Neurological:      Mental Status: He is alert.      Sensory: No sensory deficit.      Motor: No weakness.           ED Course, Procedures, & Data      [facility]    -Dislocation - Upper Extremity    Date/Time: 12/20/2023 11:52 AM    Performed by: Warner Contreras MD  Authorized by: Warner Contreras MD    Risks, benefits and alternatives discussed.      LOCATION     Location:  Finger    PRE PROCEDURE ASSESSMENT      Pre-procedure imaging:  X-ray    Imaging findings: dislocation present      Distal perfusion: normal      ANESTHESIA (see MAR for exact dosages)      Anesthesia method:  Nerve block    Block location:  Digital block    Block needle gauge:  27 G    Block anesthetic:  Lidocaine 1% w/o epi    Block technique:  Digital block    PROCEDURE DETAILS      Manipulation  performed: yes      Finger reduction method:  Direct traction    Reduction successful: yes      Reduction confirmed with imaging: yes      POST PROCEDURE DETAILS     Neurological function: normal      Distal perfusion: normal      Range of motion: improved        PROCEDURE    Patient Tolerance:  Patient tolerated the procedure well with no immediate complications    Results for orders placed during the hospital encounter of 12/20/23    POC US ABDOMEN LIMITED    Impression  Bedside FAST (Focused Assessment with Sonography in Trauma), performed and interpreted by me.  Indication: Trauma    With the patient in Trendelenburg, the RUQ, LUQ and subxiphoid views were examined for intraabdominal and thoracic free fluid and pericardial effusion. With the patient in reverse Trendelenburg, the suprapubic view was examined for intraabdominal free fluid. Image quality was satisfactory..    Findings: There is no evidence of free fluid above or below bilateral diaphragms, in the splenorenal or hepatorenal space, or in bilateral paracolic gutters. There was no free fluid seen in the pelvis adjacent to the urinary bladder.  Extended FAST exam (eFAST):  Indication: Trauma  The chest wall was evaluated for evidence of pneumothorax.    Right side:  Lung sliding artifact  Present  Comet tail artifacts or B-lines  Absent  Left side:  Lung sliding artifact  Present  Comet tail artifacts or B-lines Absent      IMPRESSION:  Negative FAST            EKG Interpretation:      Interpreted by JULIET LAWLER MD, MD  Time reviewed: 0737  Symptoms at time of EKG: chest pain   Rhythm: normal sinus   Rate: 85  Axis: Normal  Ectopy: none  Conduction: normal  ST Segments/ T Waves: No acute ischemic changes  Q Waves: III and aVf  Comparison to prior: Unchanged    Clinical Impression: no acute changes              Results for orders placed or performed during the hospital encounter of 12/20/23   XR Chest Port 1 View     Status: None    Narrative    XR CHEST  PORT 1 VIEW 12/20/2023 7:47 AM    HISTORY: Chest pain, shortness of breath, trauma    COMPARISON: 2/3/2017 chest x-ray      Impression    IMPRESSION: Acute nondisplaced right eighth rib fracture. No focal  airspace opacities, pleural effusion or pneumothorax. Few small  nodular opacities in the right lung apex are indeterminate, may be due  to superimposed soft tissue shadows or underlying nodules. Consider a  follow-up chest CT for further evaluation. Mild bronchiectasis in the  right upper lobe. The cardiac and mediastinal silhouettes are normal.     JOLANTA COWAN MD         SYSTEM ID:  [id number]   POC US ABDOMEN LIMITED     Status: None    Impression    Bedside FAST (Focused Assessment with Sonography in Trauma), performed and interpreted by me.   Indication: Trauma    With the patient in Trendelenburg, the RUQ, LUQ and subxiphoid views were examined for intraabdominal and thoracic free fluid and pericardial effusion. With the patient in reverse Trendelenburg, the suprapubic view was examined for intraabdominal free fluid. Image quality was satisfactory..     Findings: There is no evidence of free fluid above or below bilateral diaphragms, in the splenorenal or hepatorenal space, or in bilateral paracolic gutters. There was no free fluid seen in the pelvis adjacent to the urinary bladder.   Extended FAST exam (eFAST):   Indication: Trauma   The chest wall was evaluated for evidence of pneumothorax.     Right side:  Lung sliding artifact  Present     Comet tail artifacts or B-lines  Absent   Left side:  Lung sliding artifact  Present     Comet tail artifacts or B-lines Absent       IMPRESSION:  Negative FAST    XR Finger Port Right G/E 2 Views     Status: None    Narrative    EXAM: XR FINGER PORT RIGHT G/E 2 VIEWS  DATE/TIME: 12/20/2023 7:48 AM    INDICATION: pain, trauma  COMPARISON: None available.       Impression    IMPRESSION: Dorsal dislocation of the middle phalanx of the fourth  digit at the PIP joint.  Curvilinear ossific densities at the volar  margin of the head of the fourth proximal phalanx, likely mildly  displaced avulsion fracture fragments.     Irregular contour of the articular surface of the head of the fourth  metacarpal with subtle articular step-off which may be sequela of  age-indeterminate trauma.    Soft tissue edema about the fourth digit.    NOTE: ABNORMAL REPORT    THE DICTATION ABOVE DESCRIBES AN ABNORMAL REPORT FOR WHICH FOLLOW-UP  IS NEEDED.         MARTIN CHAVEZ DO         SYSTEM ID:  [id number]   XR Finger Right 2 Views     Status: None    Narrative    EXAM: XR FINGER RIGHT G/E 2 VIEWS  DATE/TIME: 12/20/2023 9:49 AM    INDICATION: Postreduction  COMPARISON: Same day for the digit radiographs.      Impression    IMPRESSION: Interval reduction of the fourth PIP joint which is now  normally aligned. Previously described curvilinear ossific densities  at the volar margin of the head of the fourth proximal phalanx are  less conspicuous which is likely related to slight differences in  positioning. Remainder unchanged.    MARTIN CHAVEZ DO         SYSTEM ID:  [id number]   CT Chest/Abdomen/Pelvis w Contrast     Status: Abnormal (Preliminary result)   Result Value Ref Range    Radiologist flags Small right pneumothorax (AA)     Narrative    CT CHEST/ABDOMEN/PELVIS WITH CONTRAST December 20, 2023 9:36 AM    CLINICAL HISTORY: Trauma. Fall. Right-sided chest, flank and right  upper quadrant pain.    TECHNIQUE: CT scan of the chest, abdomen, and pelvis was performed  following injection of IV contrast. Multiplanar reformats were  obtained. Dose reduction techniques were used.   CONTRAST: 84mL Isovue 370.    COMPARISON: Chest CT performed 2/2/2017.    FINDINGS:   LUNGS AND PLEURA: Small right pneumothorax. Moderate to severe  emphysematous changes in both lungs. Mild biapical scarring. Irregular  nodular densities at both lung apices are not significantly changed,  and are also likely related to  scarring. Mild scattered mucous  plugging bilaterally. There are scattered small indeterminate nodular  opacities in the left lung, not visualized on the previous exam, with  the largest in the left upper lobe measuring 0.9 cm (series 4 image  98) No pleural effusions.    MEDIASTINUM/AXILLAE: A mildly enlarged precarinal lymph node measures  1.1 cm, increased slightly in size since the previous exam (series 3  image 117). No other enlarged lymph nodes are identified in chest. No  pericardial effusion.    CORONARY ARTERY CALCIFICATION: Mild.    HEPATOBILIARY: A few tiny hypodensities in the liver are too small to  characterize, but could represent cysts. No calcified gallstones.    PANCREAS: Normal.    SPLEEN: Normal.    ADRENAL GLANDS: Normal.    KIDNEYS/BLADDER: A few small left renal cysts, for which no specific  follow-up would be recommended. No hydronephrosis.    BOWEL: No bowel obstruction. Sigmoid diverticulosis. No convincing  evidence for colitis or diverticulitis. Unremarkable appendix.    PELVIC ORGANS: Unremarkable.    LYMPH NODES: No enlarged lymph nodes are identified in the abdomen or  pelvis.    VASCULATURE: Mild atherosclerotic calcification. Dilatation of the  celiac artery measures up to 1.4 cm, similar to the previous exam.    ADDITIONAL FINDINGS: Small fat-containing paraumbilical hernia.    MUSCULOSKELETAL: Mildly displaced acute fracture of the right 11th rib  posterolaterally. Degenerative changes in the thoracolumbar spine.      Impression    IMPRESSION:   1.  Small right pneumothorax.  2.  Mildly displaced acute fracture of the right 11th rib  posterolaterally.  3.  Emphysema.  4.  A few scattered indeterminate pulmonary nodules in the left lung,  with the largest in the left upper lobe measuring 0.9 cm. A follow-up  CT in three months is recommended.  5.  Mild scattered mucous plugging in both lungs.  6.  A mildly enlarged precarinal lymph node in the mediastinum has  increased slightly in  size.  7.  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    [Critical Result: Small right pneumothorax]    Finding was identified on 12/20/2023 10:08 AM.     Dr. Contreras was contacted by me on 12/20/2023 10:08 AM and verbalized  understanding of the critical result.    Basic metabolic panel     Status: Abnormal   Result Value Ref Range    Sodium 138 135 - 145 mmol/L    Potassium 4.3 3.4 - 5.3 mmol/L    Chloride 103 98 - 107 mmol/L    Carbon Dioxide (CO2) 25 22 - 29 mmol/L    Anion Gap 10 7 - 15 mmol/L    Urea Nitrogen 18.9 8.0 - 23.0 mg/dL    Creatinine 0.92 0.67 - 1.17 mg/dL    GFR Estimate >90 >60 mL/min/1.73m2    Calcium 9.3 8.8 - 10.2 mg/dL    Glucose 116 (H) 70 - 99 mg/dL   INR     Status: Normal   Result Value Ref Range    INR 0.91 0.85 - 1.15   CBC with platelets and differential     Status: None   Result Value Ref Range    WBC Count 9.8 4.0 - 11.0 10e3/uL    RBC Count 5.86 4.40 - 5.90 10e6/uL    Hemoglobin 16.8 13.3 - 17.7 g/dL    Hematocrit 51.2 40.0 - 53.0 %    MCV 87 78 - 100 fL    MCH 28.7 26.5 - 33.0 pg    MCHC 32.8 31.5 - 36.5 g/dL    RDW 13.8 10.0 - 15.0 %    Platelet Count 215 150 - 450 10e3/uL    % Neutrophils 77 %    % Lymphocytes 13 %    % Monocytes 5 %    % Eosinophils 3 %    % Basophils 1 %    % Immature Granulocytes 1 %    NRBCs per 100 WBC 0 <1 /100    Absolute Neutrophils 7.6 1.6 - 8.3 10e3/uL    Absolute Lymphocytes 1.3 0.8 - 5.3 10e3/uL    Absolute Monocytes 0.5 0.0 - 1.3 10e3/uL    Absolute Eosinophils 0.2 0.0 - 0.7 10e3/uL    Absolute Basophils 0.1 0.0 - 0.2 10e3/uL    Absolute Immature Granulocytes 0.1 <=0.4 10e3/uL    Absolute NRBCs 0.0 10e3/uL   [place] Draw     Status: None (In process)    Narrative    The following orders were created for panel order [place] Draw.  Procedure                               Abnormality         Status                     ---------                               -----------         ------                     Extra Red  Top Tube[[id number]]                               Final result               Extra Green Top (Lithium...[[id number]]                      Final result               Extra Purple Top Tube[[id number]]                                                         Please view results for these tests on the individual orders.   Extra Red Top Tube     Status: None   Result Value Ref Range    Hold Specimen JIC    Extra Green Top (Lithium Heparin) Tube     Status: None   Result Value Ref Range    Hold Specimen JIC    EKG 12-lead, tracing only     Status: None   Result Value Ref Range    Systolic Blood Pressure  mmHg    Diastolic Blood Pressure  mmHg    Ventricular Rate 85 BPM    Atrial Rate 85 BPM    WA Interval 168 ms    QRS Duration 94 ms     ms    QTc 423 ms    P Axis 84 degrees    R AXIS -12 degrees    T Axis 59 degrees    Interpretation ECG       Sinus rhythm  Inferior infarct , age undetermined  Abnormal ECG  Unconfirmed report - interpretation of this ECG is computer generated - see medical record for final interpretation    Confirmed by - EMERGENCY ROOM, PHYSICIAN ([id number]),  PATEL MCCRAY ([id number]) on 12/20/2023 10:50:52 AM     CBC with platelets differential     Status: None    Narrative    The following orders were created for panel order CBC with platelets differential.  Procedure                               Abnormality         Status                     ---------                               -----------         ------                     CBC with platelets and d...[[id number]]                      Final result                 Please view results for these tests on the individual orders.               Results for orders placed or performed during the hospital encounter of 12/20/23   XR Chest Port 1 View     Status: None    Narrative    XR CHEST PORT 1 VIEW 12/20/2023 7:47 AM    HISTORY: Chest pain, shortness of breath, trauma    COMPARISON: 2/3/2017 chest x-ray      Impression    IMPRESSION: Acute nondisplaced  right eighth rib fracture. No focal  airspace opacities, pleural effusion or pneumothorax. Few small  nodular opacities in the right lung apex are indeterminate, may be due  to superimposed soft tissue shadows or underlying nodules. Consider a  follow-up chest CT for further evaluation. Mild bronchiectasis in the  right upper lobe. The cardiac and mediastinal silhouettes are normal.     JOLANTA COWAN MD         SYSTEM ID:  [id number]   POC US ABDOMEN LIMITED     Status: None    Impression    Bedside FAST (Focused Assessment with Sonography in Trauma), performed and interpreted by me.   Indication: Trauma    With the patient in Trendelenburg, the RUQ, LUQ and subxiphoid views were examined for intraabdominal and thoracic free fluid and pericardial effusion. With the patient in reverse Trendelenburg, the suprapubic view was examined for intraabdominal free fluid. Image quality was satisfactory..     Findings: There is no evidence of free fluid above or below bilateral diaphragms, in the splenorenal or hepatorenal space, or in bilateral paracolic gutters. There was no free fluid seen in the pelvis adjacent to the urinary bladder.   Extended FAST exam (eFAST):   Indication: Trauma   The chest wall was evaluated for evidence of pneumothorax.     Right side:  Lung sliding artifact  Present     Comet tail artifacts or B-lines  Absent   Left side:  Lung sliding artifact  Present     Comet tail artifacts or B-lines Absent       IMPRESSION:  Negative FAST    XR Finger Port Right G/E 2 Views     Status: None    Narrative    EXAM: XR FINGER PORT RIGHT G/E 2 VIEWS  DATE/TIME: 12/20/2023 7:48 AM    INDICATION: pain, trauma  COMPARISON: None available.       Impression    IMPRESSION: Dorsal dislocation of the middle phalanx of the fourth  digit at the PIP joint. Curvilinear ossific densities at the volar  margin of the head of the fourth proximal phalanx, likely mildly  displaced avulsion fracture fragments.     Irregular contour  of the articular surface of the head of the fourth  metacarpal with subtle articular step-off which may be sequela of  age-indeterminate trauma.    Soft tissue edema about the fourth digit.    NOTE: ABNORMAL REPORT    THE DICTATION ABOVE DESCRIBES AN ABNORMAL REPORT FOR WHICH FOLLOW-UP  IS NEEDED.         MARTIN CHAVEZ DO         SYSTEM ID:  [id number]   XR Finger Right 2 Views     Status: None    Narrative    EXAM: XR FINGER RIGHT G/E 2 VIEWS  DATE/TIME: 12/20/2023 9:49 AM    INDICATION: Postreduction  COMPARISON: Same day for the digit radiographs.      Impression    IMPRESSION: Interval reduction of the fourth PIP joint which is now  normally aligned. Previously described curvilinear ossific densities  at the volar margin of the head of the fourth proximal phalanx are  less conspicuous which is likely related to slight differences in  positioning. Remainder unchanged.    MARTIN CHAVEZ DO         SYSTEM ID:  [id number]   CT Chest/Abdomen/Pelvis w Contrast     Status: Abnormal (Preliminary result)   Result Value Ref Range    Radiologist flags Small right pneumothorax (AA)     Narrative    CT CHEST/ABDOMEN/PELVIS WITH CONTRAST December 20, 2023 9:36 AM    CLINICAL HISTORY: Trauma. Fall. Right-sided chest, flank and right  upper quadrant pain.    TECHNIQUE: CT scan of the chest, abdomen, and pelvis was performed  following injection of IV contrast. Multiplanar reformats were  obtained. Dose reduction techniques were used.   CONTRAST: 84mL Isovue 370.    COMPARISON: Chest CT performed 2/2/2017.    FINDINGS:   LUNGS AND PLEURA: Small right pneumothorax. Moderate to severe  emphysematous changes in both lungs. Mild biapical scarring. Irregular  nodular densities at both lung apices are not significantly changed,  and are also likely related to scarring. Mild scattered mucous  plugging bilaterally. There are scattered small indeterminate nodular  opacities in the left lung, not visualized on the previous exam,  with  the largest in the left upper lobe measuring 0.9 cm (series 4 image  98) No pleural effusions.    MEDIASTINUM/AXILLAE: A mildly enlarged precarinal lymph node measures  1.1 cm, increased slightly in size since the previous exam (series 3  image 117). No other enlarged lymph nodes are identified in chest. No  pericardial effusion.    CORONARY ARTERY CALCIFICATION: Mild.    HEPATOBILIARY: A few tiny hypodensities in the liver are too small to  characterize, but could represent cysts. No calcified gallstones.    PANCREAS: Normal.    SPLEEN: Normal.    ADRENAL GLANDS: Normal.    KIDNEYS/BLADDER: A few small left renal cysts, for which no specific  follow-up would be recommended. No hydronephrosis.    BOWEL: No bowel obstruction. Sigmoid diverticulosis. No convincing  evidence for colitis or diverticulitis. Unremarkable appendix.    PELVIC ORGANS: Unremarkable.    LYMPH NODES: No enlarged lymph nodes are identified in the abdomen or  pelvis.    VASCULATURE: Mild atherosclerotic calcification. Dilatation of the  celiac artery measures up to 1.4 cm, similar to the previous exam.    ADDITIONAL FINDINGS: Small fat-containing paraumbilical hernia.    MUSCULOSKELETAL: Mildly displaced acute fracture of the right 11th rib  posterolaterally. Degenerative changes in the thoracolumbar spine.      Impression    IMPRESSION:   1.  Small right pneumothorax.  2.  Mildly displaced acute fracture of the right 11th rib  posterolaterally.  3.  Emphysema.  4.  A few scattered indeterminate pulmonary nodules in the left lung,  with the largest in the left upper lobe measuring 0.9 cm. A follow-up  CT in three months is recommended.  5.  Mild scattered mucous plugging in both lungs.  6.  A mildly enlarged precarinal lymph node in the mediastinum has  increased slightly in size.  7.  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    [Critical Result: Small right pneumothorax]    Finding was  identified on 12/20/2023 10:08 AM.     Dr. Contreras was contacted by me on 12/20/2023 10:08 AM and verbalized  understanding of the critical result.    Basic metabolic panel     Status: Abnormal   Result Value Ref Range    Sodium 138 135 - 145 mmol/L    Potassium 4.3 3.4 - 5.3 mmol/L    Chloride 103 98 - 107 mmol/L    Carbon Dioxide (CO2) 25 22 - 29 mmol/L    Anion Gap 10 7 - 15 mmol/L    Urea Nitrogen 18.9 8.0 - 23.0 mg/dL    Creatinine 0.92 0.67 - 1.17 mg/dL    GFR Estimate >90 >60 mL/min/1.73m2    Calcium 9.3 8.8 - 10.2 mg/dL    Glucose 116 (H) 70 - 99 mg/dL   INR     Status: Normal   Result Value Ref Range    INR 0.91 0.85 - 1.15   CBC with platelets and differential     Status: None   Result Value Ref Range    WBC Count 9.8 4.0 - 11.0 10e3/uL    RBC Count 5.86 4.40 - 5.90 10e6/uL    Hemoglobin 16.8 13.3 - 17.7 g/dL    Hematocrit 51.2 40.0 - 53.0 %    MCV 87 78 - 100 fL    MCH 28.7 26.5 - 33.0 pg    MCHC 32.8 31.5 - 36.5 g/dL    RDW 13.8 10.0 - 15.0 %    Platelet Count 215 150 - 450 10e3/uL    % Neutrophils 77 %    % Lymphocytes 13 %    % Monocytes 5 %    % Eosinophils 3 %    % Basophils 1 %    % Immature Granulocytes 1 %    NRBCs per 100 WBC 0 <1 /100    Absolute Neutrophils 7.6 1.6 - 8.3 10e3/uL    Absolute Lymphocytes 1.3 0.8 - 5.3 10e3/uL    Absolute Monocytes 0.5 0.0 - 1.3 10e3/uL    Absolute Eosinophils 0.2 0.0 - 0.7 10e3/uL    Absolute Basophils 0.1 0.0 - 0.2 10e3/uL    Absolute Immature Granulocytes 0.1 <=0.4 10e3/uL    Absolute NRBCs 0.0 10e3/uL   [place] Draw     Status: None (In process)    Narrative    The following orders were created for panel order [place] Draw.  Procedure                               Abnormality         Status                     ---------                               -----------         ------                     Extra Red Top Tube[[id number]]                               Final result               Extra Green Top (Lithium...[[id number]]                      Final result                Extra Purple Top Tube[[id number]]                                                         Please view results for these tests on the individual orders.   Extra Red Top Tube     Status: None   Result Value Ref Range    Hold Specimen JIC    Extra Green Top (Lithium Heparin) Tube     Status: None   Result Value Ref Range    Hold Specimen JIC    EKG 12-lead, tracing only     Status: None   Result Value Ref Range    Systolic Blood Pressure  mmHg    Diastolic Blood Pressure  mmHg    Ventricular Rate 85 BPM    Atrial Rate 85 BPM    UT Interval 168 ms    QRS Duration 94 ms     ms    QTc 423 ms    P Axis 84 degrees    R AXIS -12 degrees    T Axis 59 degrees    Interpretation ECG       Sinus rhythm  Inferior infarct , age undetermined  Abnormal ECG  Unconfirmed report - interpretation of this ECG is computer generated - see medical record for final interpretation    Confirmed by - EMERGENCY ROOM, PHYSICIAN ([id number]),  PATEL MCCRAY ([id number]) on 12/20/2023 10:50:52 AM     CBC with platelets differential     Status: None    Narrative    The following orders were created for panel order CBC with platelets differential.  Procedure                               Abnormality         Status                     ---------                               -----------         ------                     CBC with platelets and d...[[id number]]                      Final result                 Please view results for these tests on the individual orders.     Medications   HYDROmorphone (PF) (DILAUDID) injection 0.5 mg (0.5 mg Intravenous $Given 12/20/23 1124)   ipratropium - albuterol 0.5 mg/2.5 mg/3 mL (DUONEB) neb solution 3 mL (3 mLs Nebulization $Given 12/20/23 0722)   HYDROmorphone (PF) (DILAUDID) injection 0.5 mg (0.5 mg Intravenous $Given 12/20/23 0724)   lidocaine 1 % injection 10 mL (10 mLs Intradermal $Given 12/20/23 0855)   sodium chloride 0.9% BOLUS 1,000 mL (0 mLs Intravenous Stopped 12/20/23 1132)   sodium chloride 0.9  % bag 100 mL for CT (42 mLs Intravenous $Given 12/20/23 0922)   iopamidol (ISOVUE-370) solution 100 mL (84 mLs Intravenous $Given 12/20/23 0923)     Labs Ordered and Resulted from Time of ED Arrival to Time of ED Departure   BASIC METABOLIC PANEL - Abnormal       Result Value    Sodium 138      Potassium 4.3      Chloride 103      Carbon Dioxide (CO2) 25      Anion Gap 10      Urea Nitrogen 18.9      Creatinine 0.92      GFR Estimate >90      Calcium 9.3      Glucose 116 (*)    INR - Normal    INR 0.91     CBC WITH PLATELETS AND DIFFERENTIAL    WBC Count 9.8      RBC Count 5.86      Hemoglobin 16.8      Hematocrit 51.2      MCV 87      MCH 28.7      MCHC 32.8      RDW 13.8      Platelet Count 215      % Neutrophils 77      % Lymphocytes 13      % Monocytes 5      % Eosinophils 3      % Basophils 1      % Immature Granulocytes 1      NRBCs per 100 WBC 0      Absolute Neutrophils 7.6      Absolute Lymphocytes 1.3      Absolute Monocytes 0.5      Absolute Eosinophils 0.2      Absolute Basophils 0.1      Absolute Immature Granulocytes 0.1      Absolute NRBCs 0.0       XR Finger Right 2 Views   Final Result   IMPRESSION: Interval reduction of the fourth PIP joint which is now   normally aligned. Previously described curvilinear ossific densities   at the volar margin of the head of the fourth proximal phalanx are   less conspicuous which is likely related to slight differences in   positioning. Remainder unchanged.      MARTIN CHAVEZ DO            SYSTEM ID:  [id number]      CT Chest/Abdomen/Pelvis w Contrast   Preliminary Result   Abnormal   IMPRESSION:    1.  Small right pneumothorax.   2.  Mildly displaced acute fracture of the right 11th rib   posterolaterally.   3.  Emphysema.   4.  A few scattered indeterminate pulmonary nodules in the left lung,   with the largest in the left upper lobe measuring 0.9 cm. A follow-up   CT in three months is recommended.   5.  Mild scattered mucous plugging in both lungs.   6.  A  mildly enlarged precarinal lymph node in the mediastinum has   increased slightly in size.   7.  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      [Critical Result: Small right pneumothorax]      Finding was identified on 12/20/2023 10:08 AM.       Dr. Contreras was contacted by me on 12/20/2023 10:08 AM and verbalized   understanding of the critical result.       XR Finger Port Right G/E 2 Views   Final Result   IMPRESSION: Dorsal dislocation of the middle phalanx of the fourth   digit at the PIP joint. Curvilinear ossific densities at the volar   margin of the head of the fourth proximal phalanx, likely mildly   displaced avulsion fracture fragments.       Irregular contour of the articular surface of the head of the fourth   metacarpal with subtle articular step-off which may be sequela of   age-indeterminate trauma.      Soft tissue edema about the fourth digit.      NOTE: ABNORMAL REPORT      THE DICTATION ABOVE DESCRIBES AN ABNORMAL REPORT FOR WHICH FOLLOW-UP   IS NEEDED.             MARTIN CHAVEZ DO            SYSTEM ID:  [id number]      XR Chest Port 1 View   Final Result   IMPRESSION: Acute nondisplaced right eighth rib fracture. No focal   airspace opacities, pleural effusion or pneumothorax. Few small   nodular opacities in the right lung apex are indeterminate, may be due   to superimposed soft tissue shadows or underlying nodules. Consider a   follow-up chest CT for further evaluation. Mild bronchiectasis in the   right upper lobe. The cardiac and mediastinal silhouettes are normal.       JOLANTA COWAN MD            SYSTEM ID:  [id number]      POC US ABDOMEN LIMITED   Final Result   Bedside FAST (Focused Assessment with Sonography in Trauma), performed and interpreted by me.    Indication: Trauma      With the patient in Trendelenburg, the RUQ, LUQ and subxiphoid views were examined for intraabdominal and thoracic free fluid and pericardial effusion. With the  patient in reverse Trendelenburg, the suprapubic view was examined for intraabdominal free fluid. Image quality was satisfactory..       Findings: There is no evidence of free fluid above or below bilateral diaphragms, in the splenorenal or hepatorenal space, or in bilateral paracolic gutters. There was no free fluid seen in the pelvis adjacent to the urinary bladder.    Extended FAST exam (eFAST):    Indication: Trauma    The chest wall was evaluated for evidence of pneumothorax.       Right side:  Lung sliding artifact  Present      Comet tail artifacts or B-lines  Absent    Left side:  Lung sliding artifact  Present      Comet tail artifacts or B-lines Absent          IMPRESSION:  Negative FAST              Critical care was performed.   Critical Care Addendum  My initial assessment, based on my review of nursing observations, review of vital signs, focused history, and physical exam, established a high suspicion that Dell Lino has severe traumatic injuries, which requires immediate intervention, and therefore he is critically ill.     After the initial assessment, the care team initiated multiple lab tests, initiated IV fluid administration, initiated medication therapy with Dilaudid, achieved orthopedic stabilization of finger dislocation, consulted with trauma staff, and performed finger PIP dislocation reduction  to provide stabilization care. Due to the critical nature of this patient, I reassessed physical exam, interpretation of vital signs, and respiratory status multiple times prior to his disposition.     Time also spent performing discussion with consultants and coordination of care.     Critical care time (excluding teaching time and procedures): 30 minutes.     Assessment & Plan    63 year old male to the emergency department with right-sided chest pain and right fourth finger pain and deformity after falling from a height of 3 feet while at work today.  The patient is in acute distress but vitally  normal on arrival.  He has deformity of the PIP on his right ring finger with normal distal CMS.  No other injury identified.  IV established, portable chest radiograph obtained and reveals a right-sided rib fracture but no pneumothorax.  POCUS fast reveals no intra-abdominal fluid and bilateral lung sliding.  Follow-up CT chest, abdomen, pelvis reveals a right-sided 11th rib fracture with displacement and a small anterior pneumothorax.  Patient remains vitally normal.  He was given IV Dilaudid for pain control.  His finger was reduced after informed consent with intact distal CMS and realignment to anatomic position on radiograph.  Discussed with trauma surgery staff-will transfer to [place] emergency department for trauma consult and admission.    I have reviewed the nursing notes. I have reviewed the findings, diagnosis, plan and need for follow up with the patient.    New Prescriptions    No medications on file       Final diagnoses:   Closed fracture of one rib of right side, initial encounter   Pneumothorax on right   Closed traumatic dislocation of proximal interphalangeal (PIP) joint of finger of right hand     Chart documentation was completed with Dragon voice-recognition software. Even though reviewed, this chart may still contain some grammatical, spelling, and word errors.     Warner Contreras Md    [facility] EMERGENCY DEPARTMENT  12/20/2023     Warner Contreras MD  12/20/23 114       Warner Contreras MD  12/20/23 1157

## 2023-12-21 ENCOUNTER — APPOINTMENT (OUTPATIENT)
Dept: GENERAL RADIOLOGY | Facility: CLINIC | Age: 63
End: 2023-12-21
Attending: PHYSICIAN ASSISTANT
Payer: OTHER MISCELLANEOUS

## 2023-12-21 VITALS
SYSTOLIC BLOOD PRESSURE: 154 MMHG | WEIGHT: 173.3 LBS | DIASTOLIC BLOOD PRESSURE: 86 MMHG | TEMPERATURE: 98.4 F | RESPIRATION RATE: 18 BRPM | HEART RATE: 91 BPM | OXYGEN SATURATION: 92 % | BODY MASS INDEX: 24.89 KG/M2

## 2023-12-21 LAB
ANION GAP SERPL CALCULATED.3IONS-SCNC: 8 MMOL/L (ref 7–15)
BUN SERPL-MCNC: 21.2 MG/DL (ref 8–23)
CALCIUM SERPL-MCNC: 8.8 MG/DL (ref 8.8–10.2)
CHLORIDE SERPL-SCNC: 109 MMOL/L (ref 98–107)
CREAT SERPL-MCNC: 0.85 MG/DL (ref 0.67–1.17)
DEPRECATED HCO3 PLAS-SCNC: 23 MMOL/L (ref 22–29)
EGFRCR SERPLBLD CKD-EPI 2021: >90 ML/MIN/1.73M2
ERYTHROCYTE [DISTWIDTH] IN BLOOD BY AUTOMATED COUNT: 14.1 % (ref 10–15)
GLUCOSE SERPL-MCNC: 99 MG/DL (ref 70–99)
HCT VFR BLD AUTO: 43 % (ref 40–53)
HGB BLD-MCNC: 13.9 G/DL (ref 13.3–17.7)
HOLD SPECIMEN: NORMAL
MAGNESIUM SERPL-MCNC: 1.9 MG/DL (ref 1.7–2.3)
MCH RBC QN AUTO: 28.8 PG (ref 26.5–33)
MCHC RBC AUTO-ENTMCNC: 32.3 G/DL (ref 31.5–36.5)
MCV RBC AUTO: 89 FL (ref 78–100)
PHOSPHATE SERPL-MCNC: 3.4 MG/DL (ref 2.5–4.5)
PLATELET # BLD AUTO: 172 10E3/UL (ref 150–450)
POTASSIUM SERPL-SCNC: 4.2 MMOL/L (ref 3.4–5.3)
RBC # BLD AUTO: 4.82 10E6/UL (ref 4.4–5.9)
SODIUM SERPL-SCNC: 140 MMOL/L (ref 135–145)
WBC # BLD AUTO: 7.5 10E3/UL (ref 4–11)

## 2023-12-21 PROCEDURE — 94150 VITAL CAPACITY TEST: CPT

## 2023-12-21 PROCEDURE — 80048 BASIC METABOLIC PNL TOTAL CA: CPT | Performed by: NURSE PRACTITIONER

## 2023-12-21 PROCEDURE — 36415 COLL VENOUS BLD VENIPUNCTURE: CPT | Performed by: NURSE PRACTITIONER

## 2023-12-21 PROCEDURE — 250N000011 HC RX IP 250 OP 636: Performed by: NURSE PRACTITIONER

## 2023-12-21 PROCEDURE — 85027 COMPLETE CBC AUTOMATED: CPT | Performed by: PHYSICIAN ASSISTANT

## 2023-12-21 PROCEDURE — 999N000157 HC STATISTIC RCP TIME EA 10 MIN

## 2023-12-21 PROCEDURE — G0378 HOSPITAL OBSERVATION PER HR: HCPCS

## 2023-12-21 PROCEDURE — 71045 X-RAY EXAM CHEST 1 VIEW: CPT | Mod: 26 | Performed by: RADIOLOGY

## 2023-12-21 PROCEDURE — 96376 TX/PRO/DX INJ SAME DRUG ADON: CPT

## 2023-12-21 PROCEDURE — 250N000013 HC RX MED GY IP 250 OP 250 PS 637: Performed by: PHYSICIAN ASSISTANT

## 2023-12-21 PROCEDURE — 83735 ASSAY OF MAGNESIUM: CPT | Performed by: NURSE PRACTITIONER

## 2023-12-21 PROCEDURE — 84100 ASSAY OF PHOSPHORUS: CPT | Performed by: NURSE PRACTITIONER

## 2023-12-21 PROCEDURE — 250N000013 HC RX MED GY IP 250 OP 250 PS 637: Performed by: NURSE PRACTITIONER

## 2023-12-21 PROCEDURE — 71045 X-RAY EXAM CHEST 1 VIEW: CPT

## 2023-12-21 RX ORDER — METHOCARBAMOL 750 MG/1
750 TABLET, FILM COATED ORAL 4 TIMES DAILY
Status: DISCONTINUED | OUTPATIENT
Start: 2023-12-21 | End: 2023-12-21 | Stop reason: HOSPADM

## 2023-12-21 RX ORDER — OXYCODONE HYDROCHLORIDE 5 MG/1
5 TABLET ORAL EVERY 6 HOURS PRN
Qty: 10 TABLET | Refills: 0 | Status: SHIPPED | OUTPATIENT
Start: 2023-12-21

## 2023-12-21 RX ORDER — LIDOCAINE 4 G/G
1-3 PATCH TOPICAL EVERY 24 HOURS
Qty: 30 PATCH | Refills: 0 | Status: SHIPPED | OUTPATIENT
Start: 2023-12-21

## 2023-12-21 RX ORDER — METHOCARBAMOL 750 MG/1
750 TABLET, FILM COATED ORAL 4 TIMES DAILY
Status: DISCONTINUED | OUTPATIENT
Start: 2023-12-21 | End: 2023-12-21

## 2023-12-21 RX ORDER — LIDOCAINE 4 G/G
1-3 PATCH TOPICAL
Status: DISCONTINUED | OUTPATIENT
Start: 2023-12-21 | End: 2023-12-21 | Stop reason: HOSPADM

## 2023-12-21 RX ORDER — METHOCARBAMOL 750 MG/1
750 TABLET, FILM COATED ORAL 4 TIMES DAILY
Qty: 42 TABLET | Refills: 0 | Status: SHIPPED | OUTPATIENT
Start: 2023-12-21

## 2023-12-21 RX ORDER — GABAPENTIN 300 MG/1
300 CAPSULE ORAL 3 TIMES DAILY
Qty: 42 CAPSULE | Refills: 0 | Status: SHIPPED | OUTPATIENT
Start: 2023-12-21 | End: 2024-01-04

## 2023-12-21 RX ADMIN — KETOROLAC TROMETHAMINE 30 MG: 30 INJECTION, SOLUTION INTRAMUSCULAR; INTRAVENOUS at 00:55

## 2023-12-21 RX ADMIN — OXYCODONE HYDROCHLORIDE 5 MG: 5 TABLET ORAL at 10:15

## 2023-12-21 RX ADMIN — SENNOSIDES AND DOCUSATE SODIUM 1 TABLET: 8.6; 5 TABLET ORAL at 10:02

## 2023-12-21 RX ADMIN — KETOROLAC TROMETHAMINE 30 MG: 30 INJECTION, SOLUTION INTRAMUSCULAR; INTRAVENOUS at 10:03

## 2023-12-21 RX ADMIN — OXYBUTYNIN CHLORIDE 5 MG: 5 TABLET ORAL at 10:02

## 2023-12-21 RX ADMIN — LIDOCAINE 3 PATCH: 4 PATCH TOPICAL at 10:04

## 2023-12-21 RX ADMIN — GABAPENTIN 300 MG: 300 CAPSULE ORAL at 10:03

## 2023-12-21 RX ADMIN — METHOCARBAMOL 750 MG: 750 TABLET ORAL at 10:02

## 2023-12-21 ASSESSMENT — ACTIVITIES OF DAILY LIVING (ADL)
ADLS_ACUITY_SCORE: 35

## 2023-12-21 NOTE — DISCHARGE INSTRUCTIONS
Broken Rib: Care Instructions  Overview     A broken rib is a crack or break in one of the bones of the rib cage. Breathing can be very painful because the muscles used for breathing pull on the rib.  In most cases, a broken rib will heal on its own. You can take pain medicine while the rib mends. Pain relief allows you to take deep breaths. In the past, doctors recommended taping or wrapping broken ribs. This is no longer done because taping makes it hard for you to take deep breaths. Taking deep breaths may help prevent pneumonia or a partial collapse of a lung.  Your rib will heal in about 6 weeks.  You heal best when you take good care of yourself. Eat a variety of healthy foods, and don't smoke.  Follow-up care is a key part of your treatment and safety. Be sure to make and go to all appointments, and call your doctor if you are having problems. It's also a good idea to know your test results and keep a list of the medicines you take.  How can you care for yourself at home?  Be safe with medicines. Read and follow all instructions on the label.  If the doctor gave you a prescription medicine for pain, take it as prescribed.  If you are not taking a prescription pain medicine, ask your doctor if you can take an over-the-counter medicine.  Even if it hurts, try to cough or take the deepest breath you can at least once every hour. This will get air deeply into your lungs. This may reduce your chance of getting pneumonia or a partial collapse of a lung. Hold a pillow against your chest to make this less painful.  Put ice or a cold pack on the area for 10 to 20 minutes at a time. Put a thin cloth between the ice and your skin.  When should you call for help?   Call 911 anytime you think you may need emergency care. For example, call if:    You have severe trouble breathing.   Call your doctor now or seek immediate medical care if:    You have some trouble breathing.     You have a fever.     You have a new or worse  "cough.   Watch closely for changes in your health, and be sure to contact your doctor if:    You have pain even after taking your medicine.     You do not get better as expected.   Where can you learn more?  Go to [email or web address]://[email or web address].net/patiented  Enter M135 in the search box to learn more about \"Broken Rib: Care Instructions.\"  Current as of: July 18, 2023               Content Version: 13.8    [id number] [employer].   Care instructions adapted under license by your healthcare professional. If you have questions about a medical condition or this instruction, always ask your healthcare professional. [employer] disclaims any warranty or liability for your use of this information.      "

## 2023-12-21 NOTE — DISCHARGE SUMMARY
[facility]    Discharge Summary  Trauma Service     Date of Admission:  12/20/2023  Date of Discharge:  12/21/2023  Attending Physician: Dr Dell Lynne   Discharging Provider: Sharron LEUNG  Date of Service (when I saw the patient): 12/21/23    Primary Provider: Jt Devine  Primary Care clinic: [address] [facility] [id number]  Phone: [phone]  Fax number: [phone]     Discharge Diagnoses   Principal Problem:    Closed traumatic fracture of ribs of right side with pneumothorax     Hospital Course   Traumatic Injury  The patient sustained the above injury as a result of 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  The mechanism of injury and factors contributing to the accident were discussed with the patient.  Strategies on how to prevent future accidents were reviewed.  The patient underwent tertiary examination to evaluate for additional injuries.  The systematic review did not find any other injuries.    Rib Fractures:  # Mildly displaced acute fracture of the right 11th rib  # Pneumothorax,   Chest CT on admission: Small right pneumothorax. Mildly displaced acute fracture of the right 11th rib posterolaterally.  In rib fracture management, pulmonary toilet and good pain control allowing for good pulmonary toilet is paramount.      Patient had significant pain overnight in area of rb fracture. Changed pain regimen for Robaxin to be scheduled. On tertiary review, patient's initial FAST was negative, repeated CBC today so increased pain is not likely from a liver laceration. Repeat CXR does not show a pneumothorax.     Prior to discharge, his pain was controlled for Dell Lino with scheduled acetaminophen, robaxin, NSAIDS, topical pain medications, and PRN oral analgesics.     In order to prevent common pulmonary complications found with rib fracture patients, Dell Lino will need to continue with  aggressive pulmonary toileting that includes, incentive spirometry, coughing and deep breathing exercises.  We recommend these continue at a minimum of QID for one month after discharge.  Patient has been provided for handout with instructions regarding this.       Orthopedic Injury:  # Dislocation of the middle phalanx 4th digit   Patient was seen in the ED for obvious deformity on right hand.   A right hand xray showed: Dorsal dislocation of the middle phalanx of the fourth digit at the PIP joint. Curvilinear ossific densities at the volar margin of the head of the fourth proximal phalanx, likely mildly displaced avulsion fracture fragments. Irregular contour of the articular surface of the head of the fourth metacarpal with subtle articular step-off which may be sequela of age-indeterminate trauma. Soft tissue edema about the fourth digit.         -Dislocation - Upper Extremity  Date/Time: 12/20/2023 11:52 AM  Performed by: Warner Contreras MD  Authorized by: Warner Contreras MD    Risks, benefits and alternatives discussed.     LOCATION     Location:  Finger  PRE PROCEDURE ASSESSMENT      Pre-procedure imaging:  X-ray    Imaging findings: dislocation present      Distal perfusion: normal    ANESTHESIA (see MAR for exact dosages)      Anesthesia method:  Nerve block    Block location:  Digital block    Block needle gauge:  27 G    Block anesthetic:  Lidocaine 1% w/o epi    Block technique:  Digital block  PROCEDURE DETAILS      Manipulation performed: yes      Finger reduction method:  Direct traction    Reduction successful: yes      Reduction confirmed with imaging: yes    POST PROCEDURE DETAILS     Neurological function: normal      Distal perfusion: normal      Range of motion: improved       Repeat xray post reduction: Interval reduction of the fourth PIP joint which is now  normally aligned        Referred patient to Ortho for follow-up after discharge.       # COPD  Patient has a history of COPD, PTA inhalers  continued during admission.  Findings on Chest CT CT: Emphysema, a few scattered indeterminate pulmonary nodules in the left lung, with the largest in the left upper lobe measuring 0.9cm. Follow-up CT in three months is recommended. Mild scattered mucous plugging in both lungs. A mildly enlarged pericardial lymph node in the mediastinum has increased slightly in size.   Patient instructed to follow-up with PCP for further management       Code Status   Full Code    SUBJECTIVE: Dell Lino is a 63 year old right-hand-dominant male with history of COPD who presented to the emergency department on 12/20/23 with right-sided chest pain and right fourth finger pain and deformity after a fall at work.  Patient stated that he was hooking up some hoses on a vehicle about 3 feet off the ground when he missed stepped and fell to the ground.  Patient stated that he struck his right chest and hand on the ground when he landed.  He denied any head injury or loss of consciousness.  No neck pain.  Patient complains of pain in the right anterolateral and posterior lower thorax as well as pain and deformity in his right fourth finger.  He denies any weakness or numbness.      Patient was found to have a rib fracture, small pneumothorax, and dislocated finger.     Prior to discharge patient has complains of 8-10/10 pain. On exam he appears in no distress, he currently in on room air, he is able to take deep breaths without grimacing, he is able to move and get OOB without difficulty. He states the pain is worse when getting in and oob, which is to be expected. Added a couple more diagnostic labs before discharge which were unremarkable and patient was medically ready for discharge.    Physical Exam   Temp: 98.4  F (36.9  C) Temp src: Oral BP: (!) 154/86 Pulse: 91   Resp: 18 SpO2: 92 % O2 Device: None (Room air) Oxygen Delivery: (S) 1/2 LPM (found on pt.)  Vitals:    12/20/23 0708   Weight: 78.6 kg (173 lb 4.8 oz)     Vital Signs with  Ranges  Temp:  [98.1  F (36.7  C)-98.6  F (37  C)] 98.4  F (36.9  C)  Pulse:  [64-91] 91  Resp:  [13-25] 18  BP: (117-154)/() 154/86  SpO2:  [92 %-100 %] 92 %  I/O last 3 completed shifts:  In: 360 [P.O.:360]  Out: 650 [Urine:650]      Constitutional: Awake, alert, cooperative, no apparent distress.  Eyes: Lids and lashes normal, PERRL, EOMI,  sclera clear, conjunctiva normal.  HENT: Normocephalic, atraumatic  Respiratory: No increased work of breathing, good air exchange, clear to auscultation bilaterally, no crackles or wheezing. Lower right side chest wall pain, mild crepitus to palpation  Cardiovascular:  regular rate and rhythm, normal S1 and S2, no S3 or S4, and no murmur.   GI: Normal bowel sounds, abdomen soft, non-distended, non-tender, no guarding  Genitourinary: voids independently  Skin: Warm & dry  Musculoskeletal: Tenderness to anterior lateral RLE, with no edema, ecchymosis, deformity, or erythremia to area. Right 4th digit in splint. All other extremities there is no redness, warmth, or swelling of the joints.   Neurologic: Awake, alert, oriented. Cranial nerves II-XII are grossly intact.  Strength and sensory is intact. No focal deficits.  Neuropsychiatric: Calm, normal eye contact, alert, affect appropriate to situation, oriented, thought process normal.    Discharge Disposition   Discharged to home  Condition at discharge: Stable  Discharge VS: Blood pressure (!) 154/86, pulse 91, temperature 98.4  F (36.9  C), temperature source Oral, resp. rate 18, weight 78.6 kg (173 lb 4.8 oz), SpO2 92%.    Consultations This Hospital Stay   PHYSICAL THERAPY ADULT IP CONSULT  OCCUPATIONAL THERAPY ADULT IP CONSULT    Discharge Orders   No discharge procedures on file.  Discharge Medications   Current Discharge Medication List        CONTINUE these medications which have NOT CHANGED    Details   acetaminophen (TYLENOL) 500 MG tablet Take 500-1,000 mg by mouth every 6 hours as needed for mild pain       albuterol (PROAIR HFA/PROVENTIL HFA/VENTOLIN HFA) 108 (90 BASE) MCG/ACT Inhaler Inhale 2 puffs into the lungs every 6 hours as needed for shortness of breath / dyspnea or wheezing       clobetasol (TEMOVATE) 0.05 % external ointment APPLY EXTERNALLY TO THE AFFECTED AREA TWICE DAILY      fluticasone-salmeterol (ADVAIR) 100-50 MCG/DOSE diskus inhaler Inhale 1 puff into the lungs every 12 hours      ibuprofen (ADVIL/MOTRIN) 200 MG tablet Take 200 mg by mouth every 8 hours as needed for pain      Nebulizers (VIOS AEROSOL DELIVERY SYSTEM) MISC AS DIRECTED FOR HOME USE           STOP taking these medications       OXYBUTYNIN CHLORIDE PO Comments:   Reason for Stopping:             Allergies   Allergies   Allergen Reactions    Codeine Other (See Comments)     Could not sleep well when taking    Doxycycline      Data   Most Recent 3 CBC's:  Recent Labs   Lab Test 12/21/23  0609 12/20/23  0728 02/01/17  1004   WBC 7.5 9.8 11.4*   HGB 13.9 16.8 15.2   MCV 89 87 88    215 159      Most Recent 3 BMP's:  Recent Labs   Lab Test 12/21/23  0610 12/20/23  0728 02/01/17  1004    138 145*   POTASSIUM 4.2 4.3 3.8   CHLORIDE 109* 103 112*   CO2 23 25 26   BUN 21.2 18.9 21   CR 0.85 0.92 0.92   ANIONGAP 8 10 7   SARAH 8.8 9.3 9.0   GLC 99 116* 133*     Most Recent 2 LFT's:  Recent Labs   Lab Test 02/01/17  1004   AST 19   ALT 22   ALKPHOS 56   BILITOTAL 1.1     Most Recent INR's and Anticoagulation Dosing History:  Anticoagulation Dose History          Latest Ref Rng & Units 2/1/2017 12/20/2023   Recent Dosing and Labs   INR 0.85 - 1.15 0.92  0.91      Most Recent 3 Troponin's:  Recent Labs   Lab Test 02/01/17  1004 02/01/17  1003   TROPI <0.015  The 99th percentile for upper reference range is 0.045 ug/L.  Troponin values in   the range of 0.045 - 0.120 ug/L may be associated with risks of adverse   clinical events.    --    TROPONIN  --  0.01     Most Recent 6 Bacteria Isolates From Any Culture (See EPIC Reports for  Culture Details):No lab results found.  Most Recent TSH, T4 and A1c Labs:No lab results found.  Results for orders placed or performed during the hospital encounter of 12/20/23   XR Chest Port 1 View    Narrative    XR CHEST PORT 1 VIEW 12/20/2023 7:47 AM    HISTORY: Chest pain, shortness of breath, trauma    COMPARISON: 2/3/2017 chest x-ray      Impression    IMPRESSION: Acute nondisplaced right eighth rib fracture. No focal  airspace opacities, pleural effusion or pneumothorax. Few small  nodular opacities in the right lung apex are indeterminate, may be due  to superimposed soft tissue shadows or underlying nodules. Consider a  follow-up chest CT for further evaluation. Mild bronchiectasis in the  right upper lobe. The cardiac and mediastinal silhouettes are normal.     JOLANTA COWAN MD         SYSTEM ID:  [id number]   POC US ABDOMEN LIMITED    Impression    Bedside FAST (Focused Assessment with Sonography in Trauma), performed and interpreted by me.   Indication: Trauma    With the patient in Trendelenburg, the RUQ, LUQ and subxiphoid views were examined for intraabdominal and thoracic free fluid and pericardial effusion. With the patient in reverse Trendelenburg, the suprapubic view was examined for intraabdominal free fluid. Image quality was satisfactory..     Findings: There is no evidence of free fluid above or below bilateral diaphragms, in the splenorenal or hepatorenal space, or in bilateral paracolic gutters. There was no free fluid seen in the pelvis adjacent to the urinary bladder.   Extended FAST exam (eFAST):   Indication: Trauma   The chest wall was evaluated for evidence of pneumothorax.     Right side:  Lung sliding artifact  Present     Comet tail artifacts or B-lines  Absent   Left side:  Lung sliding artifact  Present     Comet tail artifacts or B-lines Absent       IMPRESSION:  Negative FAST    XR Finger Port Right G/E 2 Views    Narrative    EXAM: XR FINGER PORT RIGHT G/E 2 VIEWS  DATE/TIME:  12/20/2023 7:48 AM    INDICATION: pain, trauma  COMPARISON: None available.       Impression    IMPRESSION: Dorsal dislocation of the middle phalanx of the fourth  digit at the PIP joint. Curvilinear ossific densities at the volar  margin of the head of the fourth proximal phalanx, likely mildly  displaced avulsion fracture fragments.     Irregular contour of the articular surface of the head of the fourth  metacarpal with subtle articular step-off which may be sequela of  age-indeterminate trauma.    Soft tissue edema about the fourth digit.    NOTE: ABNORMAL REPORT    THE DICTATION ABOVE DESCRIBES AN ABNORMAL REPORT FOR WHICH FOLLOW-UP  IS NEEDED.         MARTIN CHAVEZ DO         SYSTEM ID:  [id number]   XR Finger Right 2 Views    Narrative    EXAM: XR FINGER RIGHT G/E 2 VIEWS  DATE/TIME: 12/20/2023 9:49 AM    INDICATION: Postreduction  COMPARISON: Same day for the digit radiographs.      Impression    IMPRESSION: Interval reduction of the fourth PIP joint which is now  normally aligned. Previously described curvilinear ossific densities  at the volar margin of the head of the fourth proximal phalanx are  less conspicuous which is likely related to slight differences in  positioning. Remainder unchanged.    MARTIN CHAVEZ DO         SYSTEM ID:  [id number]   CT Chest/Abdomen/Pelvis w Contrast     Value    Radiologist flags Small right pneumothorax (AA)    Narrative    CT CHEST/ABDOMEN/PELVIS WITH CONTRAST December 20, 2023 9:36 AM    CLINICAL HISTORY: Trauma. Fall. Right-sided chest, flank and right  upper quadrant pain.    TECHNIQUE: CT scan of the chest, abdomen, and pelvis was performed  following injection of IV contrast. Multiplanar reformats were  obtained. Dose reduction techniques were used.   CONTRAST: 84mL Isovue 370.    COMPARISON: Chest CT performed 2/2/2017.    FINDINGS:   LUNGS AND PLEURA: Small right pneumothorax. Moderate to severe  emphysematous changes in both lungs. Mild biapical scarring.  Irregular  nodular densities at both lung apices are not significantly changed,  and are also likely related to scarring. Mild scattered mucous  plugging bilaterally. There are scattered small indeterminate nodular  opacities in the left lung, not visualized on the previous exam, with  the largest in the left upper lobe measuring 0.9 cm (series 4 image  98) No pleural effusions.    MEDIASTINUM/AXILLAE: A mildly enlarged precarinal lymph node measures  1.1 cm, increased slightly in size since the previous exam (series 3  image 117). No other enlarged lymph nodes are identified in chest. No  pericardial effusion.    CORONARY ARTERY CALCIFICATION: Mild.    HEPATOBILIARY: A few tiny hypodensities in the liver are too small to  characterize, but could represent cysts. No calcified gallstones.    PANCREAS: Normal.    SPLEEN: Normal.    ADRENAL GLANDS: Normal.    KIDNEYS/BLADDER: A few small left renal cysts, for which no specific  follow-up would be recommended. No hydronephrosis.    BOWEL: No bowel obstruction. Sigmoid diverticulosis. No convincing  evidence for colitis or diverticulitis. Unremarkable appendix.    PELVIC ORGANS: Unremarkable.    LYMPH NODES: No enlarged lymph nodes are identified in the abdomen or  pelvis.    VASCULATURE: Mild atherosclerotic calcification. Dilatation of the  celiac artery measures up to 1.4 cm, similar to the previous exam.    ADDITIONAL FINDINGS: Small fat-containing paraumbilical hernia.    MUSCULOSKELETAL: Mildly displaced acute fracture of the right 11th rib  posterolaterally. Degenerative changes in the thoracolumbar spine.      Impression    IMPRESSION:   1.  Small right pneumothorax.  2.  Mildly displaced acute fracture of the right 11th rib  posterolaterally.  3.  Emphysema.  4.  A few scattered indeterminate pulmonary nodules in the left lung,  with the largest in the left upper lobe measuring 0.9 cm. A follow-up  CT in three months is recommended.  5.  Mild scattered mucous  plugging in both lungs.  6.  A mildly enlarged precarinal lymph node in the mediastinum has  increased slightly in size.  7.  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    [Critical Result: Small right pneumothorax]    Finding was identified on 12/20/2023 10:08 AM.     Dr. Contreras was contacted by me on 12/20/2023 10:08 AM and verbalized  understanding of the critical result.     MAXWELL PATTON MD         SYSTEM ID:  [id number]   XR Chest Port 1 View    Narrative    Exam: XR CHEST PORT 1 VIEW, 12/20/2023 1:26 PM    Indication: hypoxia, knonw pneumothorax and rib fracture    Comparison: Same date CT    Findings:   Single portable upright AP view of the chest. Trachea is midline.  Known right pneumothorax is likely loculated anteriorly with no  definite visualization of pleural line on this frontal radiograph. No  pleural effusion. Emphysematous appearance of the lungs. Mild diffuse  interstitial opacities, better appreciated on CT. Unremarkable pelvic  silhouette and mediastinum. No right 11th rib fracture is not well  visualized in this frontal radiograph.      Impression    Impression: Known right pneumothorax is likely loculated anteriorly  with no definite visualization of pleural line on this frontal  radiograph. Known right sided rib fracture is also not well visualized  in this portable single frontal radiograph.    I have personally reviewed the examination and initial interpretation  and I agree with the findings.    ZELALEM LOZOYA MD         SYSTEM ID:  [id number]       Time Spent on this Encounter   I, Sharron Lopez PA-C, personally saw the patient today and spent greater than 30 minutes discharging this patient.    We appreciate the opportunity to care for your patient while in the hospital.  Should you have any questions about their injuries or this discharge summary our contact information is below.    Trauma Service   [facility]   [address] [place] [address]  [phone]

## 2023-12-21 NOTE — PLAN OF CARE
Patient discharging. Discharge paperwork was reviewed with patient. Patient expressed understanding. A copy was given to patient. Pt discharging home.No family at bedside; family will be transport -picking up patient at main lobby. Discharge medications available in outside pharmacy; pt was advised to pickup medications as soon as able. All patient belongings were taken with patient. Patient was taken to main lobby by hospital transport in wheelchair; patient was alert and oriented; pain controlled.

## 2023-12-21 NOTE — CARE PLAN
Admitted/transferred from:   2 RN full   skin assessment completed by Caridad Morillo RN and Cristine CORONA RN.  Skin assessment finding: issues found - small scabs on the right shin. Fourth finger with frog splint.    Interventions/actions: other - not required at this time.  Will continue to monitor.    Vitals: BP (!) 147/75 (BP Location: Right arm)   Pulse 78   Temp 98.2  F (36.8  C) (Oral)   Resp 16   Wt 78.6 kg (173 lb 4.8 oz)   SpO2 95%   BMI 24.89 kg/m      Time: 9350-0681  Neuro: A/O x 4, calls appropriately and makes needs known.  Activity: up with SBA.   Pain and N/V: Denies Pain and n/v.  GI/: No BM this shift, Voiding spontaneously. AUOP.   Cardiac: Denies cardiac chest pain.   Diet: Regular.   Respiratory: Denies SOB, on RA  Lines: L PIV SL.   Incisions/Drains/Skin: small scabs on the right shin. R Fourth finger with frog splint..   Lab: Reviewed.  New changes this shift: No significant changes this shift, Continue POC.

## 2023-12-21 NOTE — PROGRESS NOTES
OO DISCHARGE            List all goals to be met before discharge home:  - Adequate pain control on oral analgesia.  - Vital Signs normal or at patient baseline.  - Tolerating oral intake to maintain hydration.  - Diagnostic tests and consults completed and resulted  - Cleared for discharge from consultants' standpoint if involved in care  - Safe disposition plan has been identified  - Return to baseline functional status  - Nurse to notify provider when observation goals have been met and patient is ready for discharge.

## 2023-12-29 NOTE — PROGRESS NOTES
ASSESSMENT & PLAN    Dell was seen today for pain.    Diagnoses and all orders for this visit:    Closed traumatic dislocation of proximal interphalangeal (PIP) joint of finger of right hand  -     Orthopedic  Referral  -     XR Hand Right G/E 3 Views; Future  -     Hand Therapy Referral; Future      This issue is acute and NA. Dell presents to clinic today to discuss his left finger pain after dislocation event.  Repeat graphs were taken in clinic today and reviewed with the patient that show small, likely avulsion fracture of the base of the volar aspect of the proximal phalanx.  On examination, he has some stiffness with flexion at the joint, but has no ligamentous laxity and is able to fully extend the joint.  Ena the above findings and the normal healing process.  We determined the following plan:  -Patient will continue to wear frog splint for another week, for a total of 3 weeks of immobilization  -Referral to hand therapy placed to begin working on range of motion  -He can continue to use over-the-counter pain medications as needed  -He will follow-up in our clinic next week     Albert Rodriguez DO  [facility] SPORTS MEDICINE [facility]    -----  Chief Complaint   Patient presents with    Right Hand - Pain       SUBJECTIVE  Dell Lino is a/an 63 year old male who is seen in consultation at the request of  Sharron Lopez PA-C for evaluation of right finger fracture.     The patient is seen by themselves.  The patient is Right handed    Onset: 2 week(s) ago. Patient describes injury as falling off his truck at work and cannot recall how he fell but injured his finger and fractured his rib.   Location of Pain: right DIP   Worsened by: n/a hasn't been using much   Better with: frog splint  Treatments tried: rest/activity avoidance, previous imaging (xray 12/20/2023), casting/splinting/bracing, oxycodone and tylenol   Associated symptoms: swelling    Orthopedic/Surgical history:  "NO  Social History/Occupation: yard jockey       REVIEW OF SYSTEMS:  Review of systems negative unless mentioned in HPI     OBJECTIVE:  Ht 1.778 m (5' 10\")   Wt 78.5 kg (173 lb)   BMI 24.82 kg/m     General: healthy, alert and in no distress  Skin: no suspicious lesions or rash.  CV: distal perfusion intact   Resp: normal respiratory effort without conversational dyspnea   Psych: normal mood and affect  Gait: NORMAL  Neuro: Normal light sensory exam of RU extremity     Hand Exam    Left  Right   Inspection No atrophy, erythema , echymosis, or deformity  No atrophy, erythema , echymosis, or deformity    Palpation         Tenderness over    No tenderness to palpation of radial styloid, DRUJ, TFCC, scaphoid/snuffbox TTP over PIP, DIP joint of 4th finger   No tenderness to palpation of radial styloid, DRUJ, TFCC, scaphoid/snuffbox   Range of Motion        1st digit IP flex/ext Normal Normal      DIP flexion/extension  Normal 2nd-5th Normal 2nd-5th      PIP flexion/extension  Normal 2nd-5th Limited in 4th       MCP flexion/extension Normal Normal   Strength      1st digit IP flex/ext Full Full    DIP flexion/extension Full Full    PIP flexion/extension  Full Full    MCP flexion/extension Full Full   Pain with resisted None None   Vascular   Intact  Intact    Special Tests   1st MCP valgus 0/30 normal   Neg CMC grind  No triggering evident  Able to make 'OK' sign (AIN)  Intact resisted abduction of digits    Sensation Intact to median, ulnar, and radial nerve distributions        RADIOLOGY:  Final results and radiologist's interpretation, available in the [facility] health record.  Images were reviewed with the patient in the office today.  My personal interpretation of the performed imaging: The fourth finger is in normal alignment at the PIP and DIP joints.  There is redemonstration of the ossific densities at the volar margin of the head of the fourth proximal phalanx, consistent with an avulsion fracture at the middle " phalanx.

## 2024-01-03 ENCOUNTER — ANCILLARY PROCEDURE (OUTPATIENT)
Dept: GENERAL RADIOLOGY | Facility: CLINIC | Age: 64
End: 2024-01-03
Attending: STUDENT IN AN ORGANIZED HEALTH CARE EDUCATION/TRAINING PROGRAM
Payer: OTHER MISCELLANEOUS

## 2024-01-03 ENCOUNTER — OFFICE VISIT (OUTPATIENT)
Dept: ORTHOPEDICS | Facility: CLINIC | Age: 64
End: 2024-01-03
Attending: PHYSICIAN ASSISTANT
Payer: OTHER MISCELLANEOUS

## 2024-01-03 VITALS — BODY MASS INDEX: 24.77 KG/M2 | HEIGHT: 70 IN | WEIGHT: 173 LBS

## 2024-01-03 DIAGNOSIS — S63.289A CLOSED TRAUMATIC DISLOCATION OF PROXIMAL INTERPHALANGEAL (PIP) JOINT OF FINGER OF RIGHT HAND: Primary | ICD-10-CM

## 2024-01-03 DIAGNOSIS — S63.289A CLOSED TRAUMATIC DISLOCATION OF PROXIMAL INTERPHALANGEAL (PIP) JOINT OF FINGER OF RIGHT HAND: ICD-10-CM

## 2024-01-03 PROCEDURE — 73130 X-RAY EXAM OF HAND: CPT | Mod: TC | Performed by: RADIOLOGY

## 2024-01-03 PROCEDURE — 99204 OFFICE O/P NEW MOD 45 MIN: CPT | Performed by: STUDENT IN AN ORGANIZED HEALTH CARE EDUCATION/TRAINING PROGRAM

## 2024-01-03 NOTE — LETTER
1/3/2024         RE: Dell Lino  [address] [facility] [address]        Dear Colleague,    Thank you for referring your patient, Dell Lino, to the [facility]. Please see a copy of my visit note below.    ASSESSMENT & PLAN    Dell was seen today for pain.    Diagnoses and all orders for this visit:    Closed traumatic dislocation of proximal interphalangeal (PIP) joint of finger of right hand  -     Orthopedic  Referral  -     XR Hand Right G/E 3 Views; Future  -     Hand Therapy Referral; Future      This issue is acute and NA. Dell presents to clinic today to discuss his left finger pain after dislocation event.  Repeat graphs were taken in clinic today and reviewed with the patient that show small, likely avulsion fracture of the base of the volar aspect of the proximal phalanx.  On examination, he has some stiffness with flexion at the joint, but has no ligamentous laxity and is able to fully extend the joint.  Ena the above findings and the normal healing process.  We determined the following plan:  -Patient will continue to wear frog splint for another week, for a total of 3 weeks of immobilization  -Referral to hand therapy placed to begin working on range of motion  -He can continue to use over-the-counter pain medications as needed  -He will follow-up in our clinic next week     Albert Rodriguez DO  [facility]    -----  Chief Complaint   Patient presents with     Right Hand - Pain       SUBJECTIVE  Dell Lino is a/an 63 year old male who is seen in consultation at the request of  Sharron Lopez PA-C for evaluation of right finger fracture.     The patient is seen by themselves.  The patient is Right handed    Onset: 2 week(s) ago. Patient describes injury as falling off his truck at work and cannot recall how he fell but injured his finger and fractured his rib.   Location of Pain:  "right DIP   Worsened by: n/a hasn't been using much   Better with: frog splint  Treatments tried: rest/activity avoidance, previous imaging (xray 12/20/2023), casting/splinting/bracing, oxycodone and tylenol   Associated symptoms: swelling    Orthopedic/Surgical history: NO  Social History/Occupation: yard jockey       REVIEW OF SYSTEMS:  Review of systems negative unless mentioned in HPI     OBJECTIVE:  Ht 1.778 m (5' 10\")   Wt 78.5 kg (173 lb)   BMI 24.82 kg/m     General: healthy, alert and in no distress  Skin: no suspicious lesions or rash.  CV: distal perfusion intact   Resp: normal respiratory effort without conversational dyspnea   Psych: normal mood and affect  Gait: NORMAL  Neuro: Normal light sensory exam of RU extremity     Hand Exam    Left  Right   Inspection No atrophy, erythema , echymosis, or deformity  No atrophy, erythema , echymosis, or deformity    Palpation         Tenderness over    No tenderness to palpation of radial styloid, DRUJ, TFCC, scaphoid/snuffbox TTP over PIP, DIP joint of 4th finger   No tenderness to palpation of radial styloid, DRUJ, TFCC, scaphoid/snuffbox   Range of Motion        1st digit IP flex/ext Normal Normal      DIP flexion/extension  Normal 2nd-5th Normal 2nd-5th      PIP flexion/extension  Normal 2nd-5th Limited in 4th       MCP flexion/extension Normal Normal   Strength      1st digit IP flex/ext Full Full    DIP flexion/extension Full Full    PIP flexion/extension  Full Full    MCP flexion/extension Full Full   Pain with resisted None None   Vascular   Intact  Intact    Special Tests   1st MCP valgus 0/30 normal   Neg CMC grind  No triggering evident  Able to make 'OK' sign (AIN)  Intact resisted abduction of digits    Sensation Intact to median, ulnar, and radial nerve distributions        RADIOLOGY:  Final results and radiologist's interpretation, available in the [facility] health record.  Images were reviewed with the patient in the office today.  My personal " interpretation of the performed imaging: The fourth finger is in normal alignment at the PIP and DIP joints.  There is redemonstration of the ossific densities at the volar margin of the head of the fourth proximal phalanx, consistent with an avulsion fracture at the middle phalanx.         Again, thank you for allowing me to participate in the care of your patient.        Sincerely,        Albert Rodriguez, DO

## 2024-01-03 NOTE — Clinical Note
Ninoska Cantu, wondering if you would look at this patient's x rays. He had a volar PIP dislocation that was reduced, and has an avulsion fracture of the base of the proximal phalanx. He has full extension of the finger at both joints and no ligamentous laxity. I am treating him as a dislocation with immobilization for 3 weeks and then working on ROM, but let me know if you think I should do something different. No rush, and thanks for your help. -AJ

## 2024-01-05 NOTE — PROGRESS NOTES
"ASSESSMENT & PLAN    Dell was seen today for follow up.    Diagnoses and all orders for this visit:    Closed traumatic dislocation of proximal interphalangeal (PIP) joint of finger of right hand      This issue is acute and Improving.  Dell presents to clinic today to follow-up on his left finger pain after traumatic dislocation.  On exam today, he has improved range of motion from previous, and has been appropriately released for 3 weeks.  At this point, we can progress him to buddy taping and have him continue to work with occupational therapy to work on range of motion and strengthening.  We discussed the above findings and the following plan:  -Patient placed in buddy tape today  -He will continue to work with occupational therapy on range of motion  -He can follow-up in our clinic as needed    Albert Rodriguez [facility]    SUBJECTIVE- Interim History January 5, 2024    Chief Complaint   Patient presents with    Right Hand - Follow Up       Dell Lino is a 63 year old male who is seen in f/u up for right hand. Since last visit on 1/3/2024 patient has been doing his exercises and wearing the splint. Patient can feel some pain but it is not bad.   - Now ~ 3 weeks from initial onset        REVIEW OF SYSTEMS:  Review of Systems    OBJECTIVE:  Ht 1.778 m (5' 10\")   Wt 78.5 kg (173 lb)   BMI 24.82 kg/m     General: healthy, alert and in no distress  Skin: no suspicious lesions or rash.  CV: distal perfusion intact   Resp: normal respiratory effort without conversational dyspnea   Psych: normal mood and affect  Gait: NORMAL  Neuro: Normal light sensory exam of RU extremity   Brief Hand Exam:  Fourth finger with full extension, unable to fully flex at the PIP joint  Cap refill less than 2 seconds  Intact sensation light touch    RADIOLOGY:  No new images taken in clinic today     "

## 2024-01-08 ENCOUNTER — THERAPY VISIT (OUTPATIENT)
Dept: OCCUPATIONAL THERAPY | Facility: REHABILITATION | Age: 64
End: 2024-01-08
Attending: STUDENT IN AN ORGANIZED HEALTH CARE EDUCATION/TRAINING PROGRAM
Payer: OTHER MISCELLANEOUS

## 2024-01-08 DIAGNOSIS — S63.289A CLOSED TRAUMATIC DISLOCATION OF PROXIMAL INTERPHALANGEAL (PIP) JOINT OF FINGER OF RIGHT HAND: ICD-10-CM

## 2024-01-08 DIAGNOSIS — M79.644 PAIN OF FINGER OF RIGHT HAND: Primary | ICD-10-CM

## 2024-01-08 PROCEDURE — 97760 ORTHOTIC MGMT&TRAING 1ST ENC: CPT | Mod: GO | Performed by: OCCUPATIONAL THERAPIST

## 2024-01-08 PROCEDURE — 97110 THERAPEUTIC EXERCISES: CPT | Mod: GO | Performed by: OCCUPATIONAL THERAPIST

## 2024-01-08 PROCEDURE — 97530 THERAPEUTIC ACTIVITIES: CPT | Mod: GO | Performed by: OCCUPATIONAL THERAPIST

## 2024-01-08 PROCEDURE — 97165 OT EVAL LOW COMPLEX 30 MIN: CPT | Mod: GO | Performed by: OCCUPATIONAL THERAPIST

## 2024-01-08 NOTE — PROGRESS NOTES
"OCCUPATIONAL THERAPY EVALUATION  Type of Visit: Evaluation    See electronic medical record for Abuse and Falls Screening details.    Subjective      Presenting condition or subjective complaint: Dislocated finger  Date of onset: 01/03/24 (Referral)    Relevant medical history:     Dates & types of surgery:      Prior diagnostic imaging/testing results: X-ray     Prior therapy history for the same diagnosis, illness or injury: No      Per chart review 1/3/2024 with Dr. Gonzales, patient \"presents to clinic today to discuss his left finger pain after dislocation event.  Repeat graphs were taken in clinic today and reviewed with the patient that show small, likely avulsion fracture of the base of the volar aspect of the proximal phalanx.  On examination, he has some stiffness with flexion at the joint, but has no ligamentous laxity and is able to fully extend the joint.  Ena the above findings and the normal healing process.  We determined the following plan:  -Patient will continue to wear frog splint for another week, for a total of 3 weeks of immobilization  -Referral to hand therapy placed to begin working on range of motion  -He can continue to use over-the-counter pain medications as needed  -He will follow-up in our clinic next week.\"    - The injury occurred on 12/20, 2 weeks, 5 days post-reduction.     Prior Level of Function  Transfers: Independent  Ambulation: Independent  ADL: Independent  IADL: Driving, Finances, Housekeeping, Laundry, Meal preparation, Medication management, Work, Yard work    Living Environment  Social support: With family members   Type of home: House   Stairs to enter the home: Yes   Is there a railing: Yes   Ramp: No   Stairs inside the home: Yes   Is there a railing: Yes   Help at home: None  Equipment owned:       Employment: Yes    Hobbies/Interests:      Patient goals for therapy:  Bend my finger better.        Objective   ADDITIONAL HISTORY:  Right hand dominant  Patient reports " symptoms of pain, stiffness/loss of motion, weakness/loss of strength, and edema  Transportation: drives  Currently working in normal job without restrictions      PAIN:  Pain Level at Rest: 1/10  Pain Level with Use: 3/10  Pain Location: R RF PIP joint  Pain Quality: Aching and sore  Pain Frequency: intermittent  Pain is Worst: daytime  Pain is Exacerbated By: Finger flexion  Pain is Relieved By: Rest and NSAIDs  Pain Progression: Improved    POSTURE: Normal     EDEMA:   Finger/Thumb   (Circumference measured in cm) 1/8/2024   Index P1    PIP    P2    Long P1    PIP    P2    Ring P1 7.9 cm to RUE, 6.8 cm to LUE   PIP    P2    Small P1    PIP    P2    Thumb P1    IP      SENSATION: WNL throughout all nerve distributions; per patient report     ROM:   Hand ROM  Left AROM Right AROM    RING MP 0/85 0/81   PIP 0/85 -22/70   DIP 0/60 -5/30   Total Active Motion 230 154     OBSERVATIONS/APPEARANCE:  Mild radial curvature from what appears to be the R RF PIP joint compared to left. Edema about the PIP joint.       STRENGTH: Contraindicated    PALPATION:  Negative to palpation about the R RF PIP joint, volar plate    Assessment & Plan   CLINICAL IMPRESSIONS  Medical Diagnosis: R RF traumatic PIP joint dislocation    Treatment Diagnosis: R RF traumatic PIP joint dislocation, pain of finger, right hand    Impression/Assessment: Pt is a 63 year old male presenting to Occupational Therapy due to R RF traumatic dislocation, finger pain.  The following significant findings have been identified: Impaired activity tolerance, Impaired coordination, Impaired strength, and Pain.  These identified deficits interfere with their ability to perform self care tasks, work tasks, recreational activities, household chores, driving , medication management, financial management,  yard work, and meal planning and preparation as compared to previous level of function.   Patient's limitations or Problem List includes: Pain, Decreased ROM/motion,  Increased edema, Weakness, Decreased stability, Hypomobility, Decreased , Decreased pinch, Decreased coordination, Decreased dexterity, and Tightness in musculature of the right hand, index finger, long finger, ring finger, and small finger which interferes with the patient's ability to perform Self Care Tasks (dressing, eating), Work Tasks, Sleep Patterns, Recreational Activities, Household Chores, and Driving  as compared to previous level of function.    Clinical Decision Making (Complexity):  Assessment of Occupational Performance: 1-3 Performance Deficits  Occupational Performance Limitations: dressing, hygiene and grooming, communication management, driving and community mobility, home establishment and management, meal preparation and cleanup, shopping, sleep, work, leisure activities, and social participation  Clinical Decision Making (Complexity): Low complexity    PLAN OF CARE  Treatment Interventions:  Modalities:  US and Paraffin  Therapeutic Exercise:  AROM, AAROM, PROM, Tendon Gliding, Blocking, Reverse Blocking, Place and Hold, Contract Relax, Extensor Tracking, Isotonics, Isometrics, and Stabilization  Neuromuscular re-education:  Nerve Gliding, Coordination/Dexterity, Sensory re-education, Desensitization, Kinesthetic Training, Proprioceptive Training, Posture, Kinesiotaping, Strain Counter Strain, Isometrics, and Stabilization  Manual Techniques:  Coordination/Dexterity, Joint mobilization, Friction massage, Myofascial release, and Manual edema mobilization  Orthotic Fabrication:  Static, Finger based, and Hand based  Self Care:  Self Care Tasks, Ergonomic Considerations, and Work Tasks    Long Term Goals   OT Goal 1  Goal Identifier: Goal I  Goal Description: Patient will improve R RF QUEZADA to 180 degrees for more for improved grasp and release of objects.  Rationale: In order to maximize safety and independence with performance of self-care activities  Goal Progress: New  Target Date:  03/04/24      Frequency of Treatment: 1x/week  Duration of Treatment: 8 weeks     Education Assessment: Learner/Method: Patient;No Barriers to Learning;Pictures/Video;Demonstration;Reading;Listening     Risks and benefits of evaluation/treatment have been explained.   Patient/Family/caregiver agrees with Plan of Care.     Evaluation Time:    OT Seferino, Low Complexity Minutes (46963): 15    Signing Clinician: Jac Hook OT

## 2024-01-12 ENCOUNTER — OFFICE VISIT (OUTPATIENT)
Dept: ORTHOPEDICS | Facility: CLINIC | Age: 64
End: 2024-01-12
Payer: OTHER MISCELLANEOUS

## 2024-01-12 VITALS — BODY MASS INDEX: 24.77 KG/M2 | HEIGHT: 70 IN | WEIGHT: 173 LBS

## 2024-01-12 DIAGNOSIS — S63.289A CLOSED TRAUMATIC DISLOCATION OF PROXIMAL INTERPHALANGEAL (PIP) JOINT OF FINGER OF RIGHT HAND: Primary | ICD-10-CM

## 2024-01-12 PROCEDURE — 99213 OFFICE O/P EST LOW 20 MIN: CPT | Performed by: STUDENT IN AN ORGANIZED HEALTH CARE EDUCATION/TRAINING PROGRAM

## 2024-01-12 NOTE — LETTER
"    1/12/2024         RE: Dell Lino  [address] [facility] [address]        Dear Colleague,    Thank you for referring your patient, Dell Lino, to the [facility]. Please see a copy of my visit note below.    ASSESSMENT & PLAN    Dell was seen today for follow up.    Diagnoses and all orders for this visit:    Closed traumatic dislocation of proximal interphalangeal (PIP) joint of finger of right hand      This issue is acute and Improving.  Dell presents to clinic today to follow-up on his left finger pain after traumatic dislocation.  On exam today, he has improved range of motion from previous, and has been appropriately released for 3 weeks.  At this point, we can progress him to buddy taping and have him continue to work with occupational therapy to work on range of motion and strengthening.  We discussed the above findings and the following plan:  -Patient placed in buddy tape today  -He will continue to work with occupational therapy on range of motion  -He can follow-up in our clinic as needed    Albert Rodriguez,   [facility]    SUBJECTIVE- Interim History January 5, 2024    Chief Complaint   Patient presents with     Right Hand - Follow Up       Dell Lino is a 63 year old male who is seen in f/u up for right hand. Since last visit on 1/3/2024 patient has been doing his exercises and wearing the splint. Patient can feel some pain but it is not bad.   - Now ~ 3 weeks from initial onset        REVIEW OF SYSTEMS:  Review of Systems    OBJECTIVE:  Ht 1.778 m (5' 10\")   Wt 78.5 kg (173 lb)   BMI 24.82 kg/m     General: healthy, alert and in no distress  Skin: no suspicious lesions or rash.  CV: distal perfusion intact   Resp: normal respiratory effort without conversational dyspnea   Psych: normal mood and affect  Gait: NORMAL  Neuro: Normal light sensory exam of RU extremity   Brief Hand " Exam:  Fourth finger with full extension, unable to fully flex at the PIP joint  Cap refill less than 2 seconds  Intact sensation light touch    RADIOLOGY:  No new images taken in clinic today         Again, thank you for allowing me to participate in the care of your patient.        Sincerely,        Albert Rodriguez, DO

## 2024-01-15 ENCOUNTER — THERAPY VISIT (OUTPATIENT)
Dept: OCCUPATIONAL THERAPY | Facility: REHABILITATION | Age: 64
End: 2024-01-15
Attending: STUDENT IN AN ORGANIZED HEALTH CARE EDUCATION/TRAINING PROGRAM
Payer: OTHER MISCELLANEOUS

## 2024-01-15 DIAGNOSIS — M79.644 PAIN OF FINGER OF RIGHT HAND: ICD-10-CM

## 2024-01-15 DIAGNOSIS — S63.289A CLOSED TRAUMATIC DISLOCATION OF PROXIMAL INTERPHALANGEAL (PIP) JOINT OF FINGER OF RIGHT HAND: Primary | ICD-10-CM

## 2024-01-15 PROCEDURE — 97530 THERAPEUTIC ACTIVITIES: CPT | Mod: GO | Performed by: OCCUPATIONAL THERAPIST

## 2024-01-15 PROCEDURE — 97763 ORTHC/PROSTC MGMT SBSQ ENC: CPT | Mod: GO | Performed by: OCCUPATIONAL THERAPIST

## 2024-01-15 PROCEDURE — 97110 THERAPEUTIC EXERCISES: CPT | Mod: GO | Performed by: OCCUPATIONAL THERAPIST

## 2024-01-31 ENCOUNTER — THERAPY VISIT (OUTPATIENT)
Dept: OCCUPATIONAL THERAPY | Facility: REHABILITATION | Age: 64
End: 2024-01-31
Attending: STUDENT IN AN ORGANIZED HEALTH CARE EDUCATION/TRAINING PROGRAM
Payer: OTHER MISCELLANEOUS

## 2024-01-31 DIAGNOSIS — M79.644 PAIN OF FINGER OF RIGHT HAND: ICD-10-CM

## 2024-01-31 DIAGNOSIS — S63.289A CLOSED TRAUMATIC DISLOCATION OF PROXIMAL INTERPHALANGEAL (PIP) JOINT OF FINGER OF RIGHT HAND: Primary | ICD-10-CM

## 2024-01-31 PROCEDURE — 97763 ORTHC/PROSTC MGMT SBSQ ENC: CPT | Mod: GO | Performed by: OCCUPATIONAL THERAPIST

## 2024-01-31 PROCEDURE — 97110 THERAPEUTIC EXERCISES: CPT | Mod: 59 | Performed by: OCCUPATIONAL THERAPIST

## 2024-02-12 ENCOUNTER — THERAPY VISIT (OUTPATIENT)
Dept: OCCUPATIONAL THERAPY | Facility: REHABILITATION | Age: 64
End: 2024-02-12
Payer: OTHER MISCELLANEOUS

## 2024-02-12 DIAGNOSIS — S63.289A CLOSED TRAUMATIC DISLOCATION OF PROXIMAL INTERPHALANGEAL (PIP) JOINT OF FINGER OF RIGHT HAND: Primary | ICD-10-CM

## 2024-02-12 DIAGNOSIS — M79.644 PAIN OF FINGER OF RIGHT HAND: ICD-10-CM

## 2024-02-12 PROCEDURE — 97140 MANUAL THERAPY 1/> REGIONS: CPT | Mod: GO | Performed by: OCCUPATIONAL THERAPIST

## 2024-02-12 PROCEDURE — 97110 THERAPEUTIC EXERCISES: CPT | Mod: GO | Performed by: OCCUPATIONAL THERAPIST

## 2024-03-06 ENCOUNTER — THERAPY VISIT (OUTPATIENT)
Dept: OCCUPATIONAL THERAPY | Facility: REHABILITATION | Age: 64
End: 2024-03-06
Payer: OTHER MISCELLANEOUS

## 2024-03-06 DIAGNOSIS — S63.289A CLOSED TRAUMATIC DISLOCATION OF PROXIMAL INTERPHALANGEAL (PIP) JOINT OF FINGER OF RIGHT HAND: Primary | ICD-10-CM

## 2024-03-06 DIAGNOSIS — M79.644 PAIN OF FINGER OF RIGHT HAND: ICD-10-CM

## 2024-03-06 PROCEDURE — 97760 ORTHOTIC MGMT&TRAING 1ST ENC: CPT | Mod: GO | Performed by: OCCUPATIONAL THERAPIST

## 2024-03-06 NOTE — PROGRESS NOTES
03/06/24 0500   Appointment Info   Treating Provider Jac Hook, RANJIHT, OTR/L, CLT   Total/Authorized Visits 8 (POC)   Visits Used 5   Medical Diagnosis R RF traumatic PIP joint dislocation   OT Tx Diagnosis R RF traumatic PIP joint dislocation, pain of finger, right hand   Other pertinent information WC   Progress Note/Certification   Onset of Illness/Injury or Date of Surgery 01/03/24  (Referral)   Therapy Frequency 1x/week   Predicted Duration 8 weeks   Progress Note Due Date 03/04/24   Progress Note Completed Date 03/06/24   OT Goal 1   Goal Identifier Goal I   Goal Description Patient will improve R RF QUEZADA to 180 degrees for more for improved grasp and release of objects.   Rationale In order to maximize safety and independence with performance of self-care activities   Goal Progress Achieved, 223 degrees.   Target Date 03/04/24   Date Met 03/06/24   Subjective Report   Subjective Report My finger feels pretty much normal, just a little stiff.   Objective Measures   Objective Measures Objective Measure 1;Objective Measure 2;Objective Measure 3;Objective Measure 4;Objective Measure 5;Objective Measure 6   Objective Measure 1   Objective Measure 0-1/10   Details Pain at rest   Objective Measure 2   Objective Measure 1-2/10   Details Pain with activity   Objective Measure 3   Objective Measure 85   Details Active R RF MCP extension/flexion   Objective Measure 4   Objective Measure -10/82  (84 post-treatment)   Details Active R RF PIP extension/flexion   Objective Measure 5   Objective Measure 0/56   Details Active R RF DIP extension/flexion (pre-treatment)   Objective Measure 6   Objective Measure 84# right, 88# left   Details BUE  strength   Treatment Interventions (OT)   Interventions Therapeutic Procedure/Exercise;Therapeutic Activity;Orthotics;Manual Therapy   Therapeutic Procedure/Exercise   Therapeutic Procedure: strength, endurance, ROM, flexibillity minutes (49850) 7   Therapeutic Procedures Ther  Proc 2;Ther Proc 3   Ther Proc 1 Objective measurements to assess growth towards goals.   Ther Proc 2 Reviewed HEP with clarification provided regarding expectations for continued performance.  Recommend dorsal gutter extension splint 10 minutes x 2 to 3/day, intermittent, passive composite flexion to tolerance 5-10 x/day.   Ther Proc 3 - Details HEP   PTRx Ther Proc 1 Finger Active Range of Motion Tendon Glides Fist Series   PTRx Ther Proc 1 - Details HEP   PTRx Ther Proc 2 Finger Active Range of Motion PIP Joint Blocking   PTRx Ther Proc 2 - Details HEP   PTRx Ther Proc 3 Finger Active Range of Motion DIP Joint Blocking   PTRx Ther Proc 3 - Details HEP   Skilled Intervention For improved soft tissue extensibility, tendon excursion and edema reduction improving mobility with ADL, IADL and work.   Patient Response/Progress Tolerated well, patient demonstrated and verbalized understanding.   Therapeutic Activity   Therapeutic Activity Minutes (34322) 3   PTRx Ther Act 1 Orthosis Wear and Care   PTRx Ther Act 1 - Details HEP, dorsal gutter for exercise only.   Skilled Intervention For fracture and volar plate protection, improving mobility, stability and reducing pain with ADL, IADL and work.   Patient Response/Progress Tolerated well, patient demonstrated and verbalized understanding.   Orthotics   Orthotic Assessment, Initial session minutes (23386) 10   Type of Orthotic Dorsal gutter orthotic is revised facilitating tolerable, though near endrange neutral composite extension.  Continue wearing for exercise 10 minutes up to 2-3 times per day.   Skilled Intervention See above.   Patient Response/Progress Tolerated well, patient demonstrated and verbalized understanding.   Education   Learner/Method Patient;No Barriers to Learning;Pictures/Video;Demonstration;Reading;Listening   Plan   Home program Per PTRX, dorsal gutter with exercise only.   Updates to plan of care DC         DISCHARGE  Reason for Discharge: Patient  has met all goals.  Patient chooses to discontinue therapy.      Discharge Plan: Patient to continue home program.    Referring Provider:  Albert Rodriguez